# Patient Record
Sex: MALE | Race: WHITE | NOT HISPANIC OR LATINO | Employment: FULL TIME | URBAN - METROPOLITAN AREA
[De-identification: names, ages, dates, MRNs, and addresses within clinical notes are randomized per-mention and may not be internally consistent; named-entity substitution may affect disease eponyms.]

---

## 2017-06-08 ENCOUNTER — HOSPITAL ENCOUNTER (OUTPATIENT)
Dept: RADIOLOGY | Facility: MEDICAL CENTER | Age: 37
Discharge: HOME | End: 2017-06-08
Attending: FAMILY MEDICINE | Admitting: FAMILY MEDICINE
Payer: COMMERCIAL

## 2017-06-08 ENCOUNTER — OFFICE VISIT (OUTPATIENT)
Dept: URGENT CARE | Facility: MEDICAL CENTER | Age: 37
End: 2017-06-08
Payer: COMMERCIAL

## 2017-06-08 DIAGNOSIS — R10.9 ABDOMINAL PAIN: ICD-10-CM

## 2017-06-08 PROCEDURE — 74000 HB X-RAY EXAM OF ABDOMEN (SINGLE ANTEROPOSTERIOR VIEW): CPT

## 2017-06-08 PROCEDURE — 81002 URINALYSIS NONAUTO W/O SCOPE: CPT

## 2017-06-08 PROCEDURE — G0382 LEV 3 HOSP TYPE B ED VISIT: HCPCS

## 2017-08-22 ENCOUNTER — ALLSCRIPTS OFFICE VISIT (OUTPATIENT)
Dept: OTHER | Facility: OTHER | Age: 37
End: 2017-08-22

## 2017-08-22 ENCOUNTER — TRANSCRIBE ORDERS (OUTPATIENT)
Dept: ADMINISTRATIVE | Facility: HOSPITAL | Age: 37
End: 2017-08-22

## 2017-08-22 ENCOUNTER — APPOINTMENT (OUTPATIENT)
Dept: LAB | Facility: HOSPITAL | Age: 37
End: 2017-08-22
Attending: UROLOGY
Payer: COMMERCIAL

## 2017-08-22 DIAGNOSIS — R31.0 GROSS HEMATURIA: Primary | ICD-10-CM

## 2017-08-22 DIAGNOSIS — R31.0 GROSS HEMATURIA: ICD-10-CM

## 2017-08-22 DIAGNOSIS — N20.0 CALCULUS OF KIDNEY: ICD-10-CM

## 2017-08-22 DIAGNOSIS — N20.0 KIDNEY STONE: ICD-10-CM

## 2017-08-22 LAB
BILIRUB UR QL STRIP: ABNORMAL
CLARITY UR: ABNORMAL
COLOR UR: CLEAR
GLUCOSE (HISTORICAL): ABNORMAL
HGB UR QL STRIP.AUTO: ABNORMAL
KETONES UR STRIP-MCNC: ABNORMAL MG/DL
LEUKOCYTE ESTERASE UR QL STRIP: ABNORMAL
NITRITE UR QL STRIP: ABNORMAL
PH UR STRIP.AUTO: 7 [PH]
PROT UR STRIP-MCNC: ABNORMAL MG/DL
SP GR UR STRIP.AUTO: 1.01
UROBILINOGEN UR QL STRIP.AUTO: 0.2

## 2017-08-22 PROCEDURE — 88112 CYTOPATH CELL ENHANCE TECH: CPT

## 2017-08-27 ENCOUNTER — HOSPITAL ENCOUNTER (EMERGENCY)
Facility: HOSPITAL | Age: 37
Discharge: HOME | End: 2017-08-27
Attending: EMERGENCY MEDICINE | Admitting: EMERGENCY MEDICINE
Payer: COMMERCIAL

## 2017-08-27 VITALS
BODY MASS INDEX: 21.98 KG/M2 | HEART RATE: 65 BPM | DIASTOLIC BLOOD PRESSURE: 70 MMHG | TEMPERATURE: 96.6 F | WEIGHT: 145 LBS | OXYGEN SATURATION: 100 % | RESPIRATION RATE: 18 BRPM | SYSTOLIC BLOOD PRESSURE: 129 MMHG | HEIGHT: 68 IN

## 2017-08-27 DIAGNOSIS — N48.89 PENILE PAIN: Primary | ICD-10-CM

## 2017-08-27 LAB
BACTERIA UR QL AUTO: ABNORMAL /HPF
BILIRUB UR QL STRIP: NEGATIVE
CLARITY UR: CLEAR
COLOR UR: YELLOW
COLOR, POC: YELLOW
GLUCOSE UR STRIP-MCNC: NEGATIVE MG/DL
HGB UR QL STRIP.AUTO: ABNORMAL
HYALINE CASTS #/AREA URNS LPF: ABNORMAL /LPF
KETONES UR STRIP-MCNC: NEGATIVE MG/DL
LEUKOCYTE ESTERASE UR QL STRIP: ABNORMAL
NITRITE UR QL STRIP: NEGATIVE
NON-SQ EPI CELLS URNS QL MICRO: ABNORMAL /HPF
PH UR STRIP.AUTO: 6.5 [PH] (ref 4.5–8)
PROT UR STRIP-MCNC: NEGATIVE MG/DL
RBC #/AREA URNS AUTO: ABNORMAL /HPF
SP GR UR STRIP.AUTO: 1.01 (ref 1–1.03)
UROBILINOGEN UR QL STRIP.AUTO: 0.2 E.U./DL
WBC #/AREA URNS AUTO: ABNORMAL /HPF

## 2017-08-27 PROCEDURE — 87491 CHLMYD TRACH DNA AMP PROBE: CPT | Performed by: EMERGENCY MEDICINE

## 2017-08-27 PROCEDURE — 87591 N.GONORRHOEAE DNA AMP PROB: CPT | Performed by: EMERGENCY MEDICINE

## 2017-08-27 PROCEDURE — 81001 URINALYSIS AUTO W/SCOPE: CPT

## 2017-08-27 PROCEDURE — 81002 URINALYSIS NONAUTO W/O SCOPE: CPT | Performed by: EMERGENCY MEDICINE

## 2017-08-27 PROCEDURE — 99283 EMERGENCY DEPT VISIT LOW MDM: CPT

## 2017-08-27 RX ORDER — PHENAZOPYRIDINE HYDROCHLORIDE 200 MG/1
200 TABLET, FILM COATED ORAL 3 TIMES DAILY
Qty: 6 TABLET | Refills: 0 | Status: SHIPPED | OUTPATIENT
Start: 2017-08-27 | End: 2017-08-30

## 2017-08-28 ENCOUNTER — HOSPITAL ENCOUNTER (OUTPATIENT)
Dept: RADIOLOGY | Age: 37
Discharge: HOME | End: 2017-08-28
Payer: COMMERCIAL

## 2017-08-28 DIAGNOSIS — R31.0 GROSS HEMATURIA: ICD-10-CM

## 2017-08-28 LAB
CHLAMYDIA DNA CVX QL NAA+PROBE: NORMAL
N GONORRHOEA DNA GENITAL QL NAA+PROBE: NORMAL

## 2017-08-28 PROCEDURE — 74178 CT ABD&PLV WO CNTR FLWD CNTR: CPT

## 2017-08-28 RX ADMIN — IOHEXOL 100 ML: 350 INJECTION, SOLUTION INTRAVENOUS at 09:44

## 2017-08-29 ENCOUNTER — GENERIC CONVERSION - ENCOUNTER (OUTPATIENT)
Dept: OTHER | Facility: OTHER | Age: 37
End: 2017-08-29

## 2017-08-31 ENCOUNTER — ALLSCRIPTS OFFICE VISIT (OUTPATIENT)
Dept: OTHER | Facility: OTHER | Age: 37
End: 2017-08-31

## 2017-08-31 LAB
BILIRUB UR QL STRIP: NORMAL
CLARITY UR: NORMAL
COLOR UR: YELLOW
GLUCOSE (HISTORICAL): NORMAL
HGB UR QL STRIP.AUTO: NORMAL
KETONES UR STRIP-MCNC: NORMAL MG/DL
LEUKOCYTE ESTERASE UR QL STRIP: NORMAL
NITRITE UR QL STRIP: NORMAL
PH UR STRIP.AUTO: 5.5 [PH]
PROT UR STRIP-MCNC: NORMAL MG/DL
SP GR UR STRIP.AUTO: 1.01
UROBILINOGEN UR QL STRIP.AUTO: 0.2

## 2017-09-05 ENCOUNTER — GENERIC CONVERSION - ENCOUNTER (OUTPATIENT)
Dept: OTHER | Facility: OTHER | Age: 37
End: 2017-09-05

## 2017-09-07 ENCOUNTER — ANESTHESIA EVENT (OUTPATIENT)
Dept: PERIOP | Facility: HOSPITAL | Age: 37
End: 2017-09-07
Payer: COMMERCIAL

## 2017-09-07 RX ORDER — HYDROCODONE BITARTRATE AND ACETAMINOPHEN 5; 300 MG/1; MG/1
1 TABLET ORAL EVERY 6 HOURS PRN
COMMUNITY

## 2017-09-08 ENCOUNTER — ANESTHESIA (OUTPATIENT)
Dept: PERIOP | Facility: HOSPITAL | Age: 37
End: 2017-09-08
Payer: COMMERCIAL

## 2017-09-08 ENCOUNTER — HOSPITAL ENCOUNTER (OUTPATIENT)
Facility: HOSPITAL | Age: 37
Setting detail: OUTPATIENT SURGERY
Discharge: HOME | End: 2017-09-08
Attending: UROLOGY | Admitting: UROLOGY
Payer: COMMERCIAL

## 2017-09-08 ENCOUNTER — HOSPITAL ENCOUNTER (OUTPATIENT)
Dept: RADIOLOGY | Facility: HOSPITAL | Age: 37
Setting detail: OUTPATIENT SURGERY
Discharge: HOME | End: 2017-09-08
Payer: COMMERCIAL

## 2017-09-08 VITALS
RESPIRATION RATE: 16 BRPM | TEMPERATURE: 97.6 F | DIASTOLIC BLOOD PRESSURE: 68 MMHG | SYSTOLIC BLOOD PRESSURE: 109 MMHG | BODY MASS INDEX: 21.98 KG/M2 | HEIGHT: 68 IN | WEIGHT: 145 LBS | HEART RATE: 60 BPM | OXYGEN SATURATION: 99 %

## 2017-09-08 DIAGNOSIS — N20.1 CALCULUS OF URETER: ICD-10-CM

## 2017-09-08 PROCEDURE — 76000 FLUOROSCOPY <1 HR PHYS/QHP: CPT

## 2017-09-08 PROCEDURE — 82360 CALCULUS ASSAY QUANT: CPT | Performed by: UROLOGY

## 2017-09-08 RX ORDER — PROPOFOL 10 MG/ML
INJECTION, EMULSION INTRAVENOUS AS NEEDED
Status: DISCONTINUED | OUTPATIENT
Start: 2017-09-08 | End: 2017-09-08 | Stop reason: SURG

## 2017-09-08 RX ORDER — OXYCODONE HYDROCHLORIDE AND ACETAMINOPHEN 5; 325 MG/1; MG/1
1 TABLET ORAL EVERY 4 HOURS PRN
Status: DISCONTINUED | OUTPATIENT
Start: 2017-09-08 | End: 2017-09-08 | Stop reason: HOSPADM

## 2017-09-08 RX ORDER — SODIUM CHLORIDE 9 MG/ML
125 INJECTION, SOLUTION INTRAVENOUS CONTINUOUS
Status: DISCONTINUED | OUTPATIENT
Start: 2017-09-08 | End: 2017-09-08 | Stop reason: HOSPADM

## 2017-09-08 RX ORDER — FENTANYL CITRATE 50 UG/ML
INJECTION, SOLUTION INTRAMUSCULAR; INTRAVENOUS AS NEEDED
Status: DISCONTINUED | OUTPATIENT
Start: 2017-09-08 | End: 2017-09-08 | Stop reason: SURG

## 2017-09-08 RX ORDER — SCOLOPAMINE TRANSDERMAL SYSTEM 1 MG/1
1 PATCH, EXTENDED RELEASE TRANSDERMAL ONCE AS NEEDED
Status: DISCONTINUED | OUTPATIENT
Start: 2017-09-08 | End: 2017-09-08 | Stop reason: HOSPADM

## 2017-09-08 RX ORDER — MAGNESIUM HYDROXIDE 1200 MG/15ML
LIQUID ORAL AS NEEDED
Status: DISCONTINUED | OUTPATIENT
Start: 2017-09-08 | End: 2017-09-08 | Stop reason: HOSPADM

## 2017-09-08 RX ORDER — ONDANSETRON 2 MG/ML
4 INJECTION INTRAMUSCULAR; INTRAVENOUS ONCE AS NEEDED
Status: DISCONTINUED | OUTPATIENT
Start: 2017-09-08 | End: 2017-09-08 | Stop reason: HOSPADM

## 2017-09-08 RX ORDER — GLYCOPYRROLATE 0.2 MG/ML
INJECTION INTRAMUSCULAR; INTRAVENOUS AS NEEDED
Status: DISCONTINUED | OUTPATIENT
Start: 2017-09-08 | End: 2017-09-08 | Stop reason: SURG

## 2017-09-08 RX ORDER — ONDANSETRON 2 MG/ML
INJECTION INTRAMUSCULAR; INTRAVENOUS AS NEEDED
Status: DISCONTINUED | OUTPATIENT
Start: 2017-09-08 | End: 2017-09-08 | Stop reason: SURG

## 2017-09-08 RX ORDER — FENTANYL CITRATE/PF 50 MCG/ML
50 SYRINGE (ML) INJECTION
Status: DISCONTINUED | OUTPATIENT
Start: 2017-09-08 | End: 2017-09-08 | Stop reason: HOSPADM

## 2017-09-08 RX ORDER — LIDOCAINE HYDROCHLORIDE 10 MG/ML
INJECTION, SOLUTION INFILTRATION; PERINEURAL AS NEEDED
Status: DISCONTINUED | OUTPATIENT
Start: 2017-09-08 | End: 2017-09-08 | Stop reason: SURG

## 2017-09-08 RX ORDER — GENTAMICIN SULFATE 100 MG/100ML
100 INJECTION, SOLUTION INTRAVENOUS ONCE
Status: COMPLETED | OUTPATIENT
Start: 2017-09-08 | End: 2017-09-08

## 2017-09-08 RX ORDER — KETOROLAC TROMETHAMINE 30 MG/ML
INJECTION, SOLUTION INTRAMUSCULAR; INTRAVENOUS AS NEEDED
Status: DISCONTINUED | OUTPATIENT
Start: 2017-09-08 | End: 2017-09-08 | Stop reason: SURG

## 2017-09-08 RX ORDER — SODIUM CHLORIDE 9 MG/ML
INJECTION, SOLUTION INTRAVENOUS AS NEEDED
Status: DISCONTINUED | OUTPATIENT
Start: 2017-09-08 | End: 2017-09-08 | Stop reason: HOSPADM

## 2017-09-08 RX ADMIN — FENTANYL CITRATE 50 MCG: 50 INJECTION, SOLUTION INTRAMUSCULAR; INTRAVENOUS at 10:19

## 2017-09-08 RX ADMIN — SODIUM CHLORIDE 125 ML/HR: 0.9 INJECTION, SOLUTION INTRAVENOUS at 08:56

## 2017-09-08 RX ADMIN — GENTAMICIN SULFATE 100 MG: 100 INJECTION, SOLUTION INTRAVENOUS at 10:39

## 2017-09-08 RX ADMIN — KETOROLAC TROMETHAMINE 30 MG: 30 INJECTION, SOLUTION INTRAMUSCULAR at 10:50

## 2017-09-08 RX ADMIN — LIDOCAINE HYDROCHLORIDE 50 MG: 10 INJECTION, SOLUTION INFILTRATION; PERINEURAL at 10:23

## 2017-09-08 RX ADMIN — GLYCOPYRROLATE 0.2 MG: 0.2 INJECTION, SOLUTION INTRAMUSCULAR; INTRAVENOUS at 10:33

## 2017-09-08 RX ADMIN — PROPOFOL 200 MG: 10 INJECTION, EMULSION INTRAVENOUS at 10:23

## 2017-09-08 RX ADMIN — DEXAMETHASONE SODIUM PHOSPHATE 10 MG: 10 INJECTION INTRAMUSCULAR; INTRAVENOUS at 10:41

## 2017-09-08 RX ADMIN — ONDANSETRON HYDROCHLORIDE 4 MG: 2 INJECTION, SOLUTION INTRAVENOUS at 10:44

## 2017-09-08 RX ADMIN — SODIUM CHLORIDE 125 ML/HR: 0.9 INJECTION, SOLUTION INTRAVENOUS at 11:28

## 2017-09-08 RX ADMIN — CEFAZOLIN SODIUM 2000 MG: 2 SOLUTION INTRAVENOUS at 10:26

## 2017-09-08 RX ADMIN — SCOPALAMINE 1 PATCH: 1 PATCH, EXTENDED RELEASE TRANSDERMAL at 09:50

## 2017-09-18 ENCOUNTER — GENERIC CONVERSION - ENCOUNTER (OUTPATIENT)
Dept: OTHER | Facility: OTHER | Age: 37
End: 2017-09-18

## 2017-09-19 LAB
CA PHOS MFR STONE: 5 %
CALCIUM OXALATE DIHYDRATE MFR STONE IR: 25 %
COLOR STONE: NORMAL
COM MFR STONE: 70 %
COMMENT-STONE3: NORMAL
COMPOSITION: NORMAL
LABORATORY COMMENT REPORT: NORMAL
NIDUS STONE QL: NORMAL
PHOTO: NORMAL
SIZE STONE: NORMAL MM
STONE ANALYSIS-IMP: NORMAL
SURFACE CRYSTALS: NORMAL
WT STONE: 28.4 MG

## 2017-12-11 ENCOUNTER — TRANSCRIBE ORDERS (OUTPATIENT)
Dept: ADMINISTRATIVE | Facility: HOSPITAL | Age: 37
End: 2017-12-11

## 2017-12-11 ENCOUNTER — APPOINTMENT (OUTPATIENT)
Dept: RADIOLOGY | Facility: MEDICAL CENTER | Age: 37
End: 2017-12-11
Attending: UROLOGY
Payer: COMMERCIAL

## 2017-12-11 ENCOUNTER — HOSPITAL ENCOUNTER (OUTPATIENT)
Dept: ULTRASOUND IMAGING | Facility: MEDICAL CENTER | Age: 37
Discharge: HOME | End: 2017-12-11
Attending: UROLOGY
Payer: COMMERCIAL

## 2017-12-11 ENCOUNTER — GENERIC CONVERSION - ENCOUNTER (OUTPATIENT)
Dept: OTHER | Facility: OTHER | Age: 37
End: 2017-12-11

## 2017-12-11 DIAGNOSIS — N20.0 KIDNEY STONE: ICD-10-CM

## 2017-12-11 DIAGNOSIS — N20.0 CALCULUS OF KIDNEY: ICD-10-CM

## 2017-12-11 PROCEDURE — 76770 US EXAM ABDO BACK WALL COMP: CPT

## 2017-12-11 PROCEDURE — 74000 HB X-RAY EXAM OF ABDOMEN (SINGLE ANTEROPOSTERIOR VIEW): CPT

## 2017-12-18 ENCOUNTER — ALLSCRIPTS OFFICE VISIT (OUTPATIENT)
Dept: OTHER | Facility: OTHER | Age: 37
End: 2017-12-18

## 2017-12-18 DIAGNOSIS — N20.0 CALCULUS OF KIDNEY: ICD-10-CM

## 2017-12-18 LAB
BILIRUB UR QL STRIP: NORMAL
CLARITY UR: NORMAL
COLOR UR: YELLOW
GLUCOSE (HISTORICAL): NORMAL
HGB UR QL STRIP.AUTO: NORMAL
KETONES UR STRIP-MCNC: NORMAL MG/DL
LEUKOCYTE ESTERASE UR QL STRIP: NORMAL
NITRITE UR QL STRIP: NORMAL
PH UR STRIP.AUTO: 5.5 [PH]
PROT UR STRIP-MCNC: NORMAL MG/DL
SP GR UR STRIP.AUTO: 1
UROBILINOGEN UR QL STRIP.AUTO: 0.2

## 2018-01-12 NOTE — MISCELLANEOUS
Message   Recorded as Task   Date: 08/29/2017 11:43 AM, Created By: Josefina Perez   Task Name: Medical Complaint Callback   Assigned To: Arun POWELL,TEAM   Regarding Patient: Fab Cortez, Status: Active   CommentAlie Orlando - 29 Aug 2017 11:43 AM     TASK CREATED  Caller: Self; Medical Complaint; [phone] (Home)  PATIENT CALLED ASKING TO SPEAK WITH  [facility] REGARDING HIS KIDNEY STONES, PT HAS SOME DISCOMFORT PLEASE CALL HIM BACK   Elaine Sparrow - 29 Aug 2017 12:03 PM     TASK EDITED  Pt  had CT and labs done 8/28  C/O penile discomfort requesting appt to be moved up  Appt for 8/31 with Dr Jana Crespo  Active Problems    1  Abdominal pain (789 00) (R10 9)   2  Calculus of kidney (592 0) (N20 0)   3  Dysuria (788 1) (R30 0)   4  Hematuria, gross (599 71) (R31 0)   5  Renal colic (859 7) (F42)    Current Meds   1  Tamsulosin HCl - 0 4 MG Oral Capsule; take 1 capsule daily; Therapy: 13QBT9461 to (Evaluate:23Jun2017)  Requested for: 32EMZ9302; Last   Rx:08Jun2017 Ordered    Allergies    1   No Known Drug Allergies    Signatures   Electronically signed by : Rashi Mccord RN; Aug 29 2017 12:04PM EST                       (Author)

## 2018-01-13 VITALS
WEIGHT: 146 LBS | HEIGHT: 68 IN | SYSTOLIC BLOOD PRESSURE: 110 MMHG | BODY MASS INDEX: 22.13 KG/M2 | DIASTOLIC BLOOD PRESSURE: 76 MMHG

## 2018-01-13 VITALS
WEIGHT: 148 LBS | SYSTOLIC BLOOD PRESSURE: 120 MMHG | HEIGHT: 68 IN | BODY MASS INDEX: 22.43 KG/M2 | DIASTOLIC BLOOD PRESSURE: 70 MMHG

## 2018-01-13 NOTE — MISCELLANEOUS
Message   Recorded as Task   Date: 09/05/2017 08:42 AM, Created By: Kamille Pan   Task Name: Call Back   Assigned To: Arun POWELL,TEAM   Regarding Patient: Bj Batista, Status: Active   Comment:    Bree Gauthier - 05 Sep 2017 8:42 AM     TASK CREATED  PATIENT QUESTIONING RECOVERY TIME AND EXPECTED PAIN LEVEL AFTER HIS 9/8/2017 CYSTOSCOPY, LEFT USCOPE, LEFT  STENT INSERTION WITH DR Na Boothe  PLEASE CONTACT THE PATIENT AT [phone]  MESSAGE LEFT ON MY VOICE MAIL 9/1/2017 @ 5:00PM    Elaine Sparrow - 05 Sep 2017 9:19 AM     TASK EDITED  Pt  had upcoming questions re: procedure  Questions answered to pt's satisfaction  Active Problems    1  Abdominal pain (789 00) (R10 9)   2  Calculus of kidney (592 0) (N20 0)   3  Dysuria (788 1) (R30 0)   4  Hematuria, gross (599 71) (R31 0)   5  Renal colic (507 6) (Z80)    Current Meds   1  Vicodin 5-300 MG Oral Tablet; TAKE 1 TO 2 TABLETS EVERY 4 TO 6 HOURS AS   NEEDED FOR PAIN;   Therapy: 42Hbn1125 to (Evaluate:42Yzo4909); Last Rx:[id number] Ordered    Allergies    1   No Known Drug Allergies    Signatures   Electronically signed by : Francis Nova RN; Sep  5 2017  9:20AM EST                       (Author)

## 2018-01-14 VITALS
DIASTOLIC BLOOD PRESSURE: 64 MMHG | WEIGHT: 145 LBS | HEIGHT: 68 IN | SYSTOLIC BLOOD PRESSURE: 100 MMHG | BODY MASS INDEX: 21.98 KG/M2

## 2018-01-23 VITALS
BODY MASS INDEX: 23.49 KG/M2 | SYSTOLIC BLOOD PRESSURE: 116 MMHG | DIASTOLIC BLOOD PRESSURE: 72 MMHG | HEIGHT: 68 IN | WEIGHT: 155 LBS

## 2018-08-21 ENCOUNTER — APPOINTMENT (OUTPATIENT)
Dept: LAB | Facility: CLINIC | Age: 38
End: 2018-08-21

## 2018-08-21 ENCOUNTER — TRANSCRIBE ORDERS (OUTPATIENT)
Dept: LAB | Facility: CLINIC | Age: 38
End: 2018-08-21

## 2018-08-21 DIAGNOSIS — Z00.8 HEALTH EXAMINATION IN POPULATION SURVEY: ICD-10-CM

## 2018-08-21 DIAGNOSIS — Z00.8 HEALTH EXAMINATION IN POPULATION SURVEY: Primary | ICD-10-CM

## 2018-08-21 LAB
CHOLEST SERPL-MCNC: 271 MG/DL (ref 50–200)
EST. AVERAGE GLUCOSE BLD GHB EST-MCNC: 108 MG/DL
HBA1C MFR BLD: 5.4 % (ref 4.2–6.3)
HDLC SERPL-MCNC: 74 MG/DL (ref 40–60)
LDLC SERPL CALC-MCNC: 185 MG/DL (ref 0–100)
NONHDLC SERPL-MCNC: 197 MG/DL
TRIGL SERPL-MCNC: 62 MG/DL

## 2018-08-21 PROCEDURE — 80061 LIPID PANEL: CPT

## 2018-08-21 PROCEDURE — 36415 COLL VENOUS BLD VENIPUNCTURE: CPT

## 2018-08-21 PROCEDURE — 83036 HEMOGLOBIN GLYCOSYLATED A1C: CPT

## 2021-03-15 ENCOUNTER — OFFICE VISIT (OUTPATIENT)
Dept: URGENT CARE | Facility: CLINIC | Age: 41
End: 2021-03-15
Payer: COMMERCIAL

## 2021-03-15 VITALS
HEART RATE: 92 BPM | WEIGHT: 145 LBS | TEMPERATURE: 97.8 F | RESPIRATION RATE: 18 BRPM | DIASTOLIC BLOOD PRESSURE: 60 MMHG | SYSTOLIC BLOOD PRESSURE: 92 MMHG | OXYGEN SATURATION: 95 % | BODY MASS INDEX: 21.98 KG/M2 | HEIGHT: 68 IN

## 2021-03-15 DIAGNOSIS — R51.9 ACUTE NONINTRACTABLE HEADACHE, UNSPECIFIED HEADACHE TYPE: Primary | ICD-10-CM

## 2021-03-15 DIAGNOSIS — R11.0 NAUSEA: ICD-10-CM

## 2021-03-15 PROCEDURE — G0382 LEV 3 HOSP TYPE B ED VISIT: HCPCS | Performed by: PHYSICIAN ASSISTANT

## 2021-03-15 PROCEDURE — U0003 INFECTIOUS AGENT DETECTION BY NUCLEIC ACID (DNA OR RNA); SEVERE ACUTE RESPIRATORY SYNDROME CORONAVIRUS 2 (SARS-COV-2) (CORONAVIRUS DISEASE [COVID-19]), AMPLIFIED PROBE TECHNIQUE, MAKING USE OF HIGH THROUGHPUT TECHNOLOGIES AS DESCRIBED BY CMS-2020-01-R: HCPCS | Performed by: PHYSICIAN ASSISTANT

## 2021-03-15 PROCEDURE — U0005 INFEC AGEN DETEC AMPLI PROBE: HCPCS | Performed by: PHYSICIAN ASSISTANT

## 2021-03-15 RX ORDER — ONDANSETRON 4 MG/1
8 TABLET, ORALLY DISINTEGRATING ORAL EVERY 8 HOURS PRN
Qty: 20 TABLET | Refills: 0 | Status: SHIPPED | OUTPATIENT
Start: 2021-03-15

## 2021-03-15 NOTE — PROGRESS NOTES
[facility]        NAME: Mary Grace Calix is a 36 y o  male  : 1980    MRN: [id number]  DATE: March 15, 2021  TIME: 1:36 PM    Assessment and Plan   Acute nonintractable headache, unspecified headache type [R51 9]  1  Acute nonintractable headache, unspecified headache type  Novel Coronavirus (COVID-19), PCR [facility] Collected at [place] [address] or [facility]    Novel Coronavirus (COVID-19), PCR [facility] Collected at [place] [address] or [facility]   2  Nausea  ondansetron (ZOFRAN-ODT) 4 mg disintegrating tablet         Patient Instructions   Patient Instructions     If your symptoms began on 3/7 you 10 day quarantine will end on 3/17/2021  Remain self isolated until then  COVID instructions provided to patient and patient instructed to self isolate at home until swab returns  Will follow up when COVID swab is available, Recommend to check [address] [place] [address] for quickest access to results  If result is positive individual must quarantine for 10 days from symptom onset  If a significant exposure occurred ( within 6 feet of a COVID positive pt for 15 minutes or more without a mask on) the exposed individual must self isolate for 14 days from that date and monitor for symptoms  Recommend taking Vitamins such as D3, C and Zinc OTC  Vitamin D3 2000 IU once a day  Vitamin C 1g by mouth twice a day 12 hours apart  It is recommended to wear a mask while in public or within 6 feet from others, proper hand washing and hygiene  If you are feeling unwell, we recommend to self-isolate at home and stay away from household contacts until well again  If you develop any chest pain, chest pain with deep breathing, shortness of breath, or trouble breathing go to the ER  COVID-19 (Coronavirus Disease 2019)   AMBULATORY CARE:   Coronavirus disease 2019 (COVID-19)  is the disease caused by the novel (new) coronavirus first discovered in 2019   Coronaviruses generally cause upper respiratory (nose, throat, and lung) infections, such as a cold  The new virus can also cause serious lower respiratory conditions, such as pneumonia or acute respiratory distress syndrome (ARDS)  Anyone can develop serious problems from the new virus, but your risk is higher if you are 65 or older  A weak immune system, diabetes, or a heart or lung condition can also increase your risk  Signs and symptoms: You may not develop any signs or symptoms  Signs and symptoms that do develop usually start about 5 days after infection but can take 2 to 14 days  Signs and symptoms range from mild to severe  You may feel like you have the flu or a bad cold  Information on COVID-19 is still being learned  Tell your healthcare provider if you think you were infected but develop signs or symptoms not listed below:  · A cough    · Shortness of breath or trouble breathing that may become severe    · A fever of at least 100 4°F, or 38°C (may be lower in adults 65 or older)    · Chills that might include shaking    · Muscle pain, body aches, or a headache    · A sore throat    · Suddenly not being able to taste or smell anything    · Feeling mentally and physically tired (fatigue)    · Congestion (stuffy head and nose), or a runny nose    · Diarrhea, nausea, or vomiting    If you think you or someone you know may be infected:  Do the following to protect others:  · If emergency care is needed,  tell the  about the possible infection, or call ahead and tell the emergency department  · Call a healthcare provider  for instructions if symptoms are mild  Anyone who may be infected should not  arrive without calling first  The provider will need to protect staff members and other patients  · The person who may be infected needs to wear a face covering  while getting medical care  This will help lower the risk of infecting others  Coverings are not used for anyone who is younger than 2 years, has breathing problems, or cannot remove it   The provider can give you instructions for anyone who cannot wear a covering  Call your local emergency number (911 in the [address]) or go to the emergency department if:   · You have trouble breathing or shortness of breath at rest     · You have chest pain or pressure that lasts longer than 5 minutes  · You become confused or hard to wake  · Your lips or face are blue  · You have a fever of 104°F (40°C) or higher  Call your doctor if:   · You do not  have symptoms of COVID-19 but had close physical contact within 14 days with someone who tested positive  · You have questions or concerns about your condition or care  How COVID-19 is diagnosed: If you think you have COVID-19, call your healthcare provider  In some areas, testing is only done if a person has severe symptoms or is hospitalized  Testing is done more widely in other places  Your provider will tell you what to do based on your symptoms and the rules in your area  In general, the following may be used:  · A viral test  shows if you have a current infection  Samples are taken from your nose and throat, usually with swabs  You may need to wait several days to get the test results  Your healthcare provider will tell you how to get your results  You will need to quarantine (stay physically away from others) until you get your results  If results show you have COVID-19, you will need to quarantine until you are well  Your provider or other health official may give you more directions  You will also need to prevent another infection until it is known if you can get COVID-19 again  · An antibody test  shows if you had a past infection  Blood samples are used for this test  Antibodies are made by your immune system to attack the virus that causes COVID-19  Antibodies will form 1 to 3 weeks after you are infected  It is not known if antibodies prevent a second infection, or for how long a person might be protected   If you have antibodies, you will still need to be careful around others until more is known  · CT scans or x-rays  may be used to check for signs of pneumonia  The 2019 coronavirus causes a specific kind of pneumonia, usually in both lungs  Treatment:  No medicine or specific treatment is currently approved for COVID-19  The following may be used to manage your symptoms or treat the effects of COVID-19:  · Mild symptoms  may get better on their own  If you do not need to be treated in a hospital, you will be given instructions to use at home  Your condition will be closely monitored  You will need to watch for worsening symptoms and seek immediate care if needed  Talk to your healthcare provider about the following:    ? Relieve your symptoms  To soothe a sore throat, gargle with warm salt water, or use throat lozenges or a throat spray  Your healthcare provider may recommend a cough medicine  Drink more liquids to thin and loosen mucus and to prevent dehydration  Use decongestants or saline drops as directed for nasal congestion  ? NSAIDs or acetaminophen  can help lower a fever and relieve body aches or a headache  Follow directions  If not taken correctly, NSAIDs can cause kidney damage and acetaminophen can cause liver damage  · Severe or life-threatening symptoms  are treated in the hospital  You may need a combination of the following:    ? Medicines  may be given to reduce inflammation or to fight the virus  You may also need blood thinners to prevent or treat blood clots  If you have a deep vein thrombosis (DVT) or pulmonary embolism (PE), you may need to keep using blood thinners for 3 months  ? Extra oxygen  may be given if you have respiratory failure  This means your lungs cannot get enough oxygen into your blood and out to your organs  Extra oxygen can help prevent organ failure  ? A ventilator  may be used to help you breathe  ? Convalescent plasma (part of blood)  from a patient who has recovered from COVID-19 may be used  The plasma contains antibodies that can help your body fight the infection  Convalescent plasma is only given to patients who have severe signs and symptoms  How the 2019 coronavirus spreads: The virus spreads quickly and easily  You can become infected if you are in contact with a large amount of the virus, even for a short time  You can also become infected by being around a small amount of virus for a long time  The following are ways the virus is thought to spread, but more information may be coming:  · Droplets are the most common way all coronaviruses spread  The virus can travel in droplets that form when a person talks, coughs, or sneezes  Anyone who breathes in the droplets or gets them in his or her eyes can become infected with the virus  Close personal contact with an infected person is thought to be the main way the virus spreads  Close personal contact means you are within 6 feet (2 meters) of the person  · Person-to-person contact can spread the virus  For example, a person with the virus on his or her hands can spread it by shaking hands with someone  At this time, it does not appear that the virus can be passed to a baby during pregnancy or delivery  The baby can be infected after he or she is born through person-to-person contact  The virus also does not appear to spread in breast milk  If you are pregnant or breastfeeding, talk to your healthcare provider or obstetrician about any concerns you have  · The virus can stay on objects and surfaces  A person can get the virus on his or her hands by touching the object or surface  Infection happens if the person then touches his or her eyes or mouth with unwashed hands  It is not yet known how long the virus can stay on an object or surface  That is why it is important to clean all surfaces that are used regularly  · An infected animal may be able to infect a person who touches it  This may happen at live markets or on a farm      How everyone can lower the risk for COVID-19:  The best way to prevent infection is to avoid anyone who is infected, but this can be hard to do  An infected person can spread the virus before signs or symptoms begin, or even if signs or symptoms never develop  The following can help lower the risk for infection:      · Wash your hands often throughout the day  Use soap and water  Rub your soapy hands together, lacing your fingers  Wash the front and back of each hand, and in between your fingers  Use the fingers of one hand to scrub under the fingernails of the other hand  Wash for at least 20 seconds  Rinse with warm, running water for several seconds  Then dry your hands with a clean towel or paper towel  Use hand  that contains alcohol if soap and water are not available  Do not touch your eyes, nose, or mouth without washing your hands first  Teach children how to wash their hands and use hand   · Cover a sneeze or cough  This prevents droplets from traveling from you to others  Turn your face away and cover your mouth and nose with a tissue  Throw the tissue away  Use the bend of your arm if a tissue is not available  Then wash your hands well with soap and water or use hand   Turn and cover your face if you are around someone who is sneezing or coughing  Teach children how to cover a cough or sneeze  · Follow worldwide, national, and local social distancing guidelines  Social distancing means people avoid close physical contact so the virus cannot spread from one person to another  Keep at least 6 feet (2 meters) between you and others  Also keep this distance from anyone who comes to your home, such as someone making a delivery  · Make a habit of not touching your face  It is not known how long the virus can stay on objects and surfaces  If you get the virus on your hands, you can transfer it to your eyes, nose, or mouth and become infected   You can also transfer it to objects, surfaces, or people  Be aware of what you touch when you go out  Examples include handrails and elevator buttons  Try not to touch anything with bare hands unless it is necessary  Wash your hands before you leave your home and when you return  · Clean and disinfect high-touch surfaces and objects often  Use a disinfecting solution or wipes  You can make a solution by diluting 4 teaspoons of bleach in 1 quart (4 cups) of water  Clean and disinfect even if you think no one living in or coming to your home is infected with the virus  You can wipe items with a disinfecting cloth before you bring them into your home  Wash your hands after you handle anything you bring into your home  · Make your immune system as healthy as possible  A weakened immune system makes you more vulnerable to the new coronavirus  No COVID-19 vaccine is available yet  Vaccines such as the flu and pneumonia vaccines can help your immune system  Your healthcare provider can tell you which vaccines to get, and when to get them  Keep your immune system as strong as possible  Do not smoke  Eat healthy foods, exercise regularly, and try to manage stress  Go to bed and wake up at the same times each day  Social distancing guidelines:  National and local social distancing rules vary  Rules may change over time as restrictions are lifted  Restrictions may return if an outbreak happens where you live  It is important to know and follow all current social distancing rules in your area  The following are general guidelines:  · Limit trips out of your home  You may be able to have food, medicines, and other supplies delivered  If possible, have delivered items left at your door or other area  Try not to have someone hand you an item  You will be so close to the person that the virus can spread between you  · Do not have close physical contact with anyone who does not live in your home    Do not shake hands with, hug, or kiss a person as a greeting  Stand or walk as far from others as possible  If you must use public transportation (such as a bus or subway), try to sit or stand away from others  You can stay safely connected with others through phone calls, e-mail messages, social media websites, and video chats  Check in on anyone who may be having a hard time socially distancing, or who lives alone  Ask administrators at nursing homes or long-term care facilities how you can safely communicate with someone living there  · Wear a cloth face covering around others who do not live in your home  Face coverings help prevent the virus from spreading to others in droplets  You can use a clear face covering if someone needs to read your lips  This is a cloth covering that has plastic over the mouth area so your lips can be seen  Do not use coverings that have breathing valves or vents  The virus can travel out of the valve or vent and be spread to others  Do not take your covering off to talk, cough, or sneeze  Do not use coverings on children younger than 2 years or on anyone who has breathing problems or cannot remove it  · Only allow medical or other necessary professionals into your home  Wear your face covering, and remind professionals to wear a face covering  Remind them to wash their hands when they arrive and before they leave  Do not  let anyone who does not live in your home in, even if the person is not sick  A person can pass the virus to others before symptoms of COVID-19 begin  Some people never even develop symptoms  Children commonly have mild symptoms or no symptoms  It may be hard to tell a child not to hug or kiss you  Explain that this is how he or she can help you stay healthy  · Do not go to someone else's home unless it is necessary  Do not go over to visit, even if the person is lonely  Only go if you need to help him or her  Make sure you both wear face coverings while you are there      · Avoid large gatherings and crowds  Gatherings or crowds of 10 or more individuals can cause the virus to spread  Examples of gatherings include parties, sporting events, [employer] services, and conferences  Crowds may form at beaches, way, and tourist attractions  Protect yourself by staying away from large gatherings and crowds  · Ask your healthcare provider for other ways to have appointments  You may be able to have appointments without having to go into the provider's office  Some providers offer phone, video, or other types of appointments  You may also be able to get prescriptions for a few months of your medicines at a time  · Stay safe if you must go out to work  You may have a job that can only be done outside your home  Keep physical distance between you and other workers as much as possible  Follow your employer's rules so everyone stays safe  If you have COVID-19 and are recovering at home:  Healthcare providers will give you specific instructions to follow  The following are general guidelines to remind you how to keep others safe until you are well:  · Wash your hands often  Use soap and water as much as possible  You can use hand  that contains alcohol if soap and water are not available  Do not share towels with anyone  If you use paper towels, throw them away in a lined trash can kept in your room or area  Use a covered trash can, if possible  · Do not go out of your home unless it is necessary  You may have to go to your healthcare provider's office for check-ups or to get prescription refills  Do not arrive at the provider's office without an appointment  Providers have to make their offices safe for staff and other patients  · Do not have close physical contact with anyone unless it is necessary  Only have close physical contact with a person giving direct care, or a baby or child you must care for  Family members and friends should not visit you   If possible, stay in a separate area or room of your home if you live with others  No one should go into the area or room except to give you care  You can visit with others by phone, video chat, e-mail, or similar systems  It is important to stay connected with others in your life while you recover  · Wear a face covering while others are near you  This can help prevent droplets from spreading the virus when you talk, sneeze, or cough  Put the covering on before anyone comes into your room or area  Remind the person to cover his or her nose and mouth before going in to provide care for you  · Do not share items  Do not share dishes, towels, or other items with anyone  Items need to be washed after you use them  · Protect your baby  Wash your hands with soap and water often throughout the day  Wear a clean face covering while you breastfeed, or while you express or pump breast milk  If possible, ask someone who is well to care for your baby  You can put breast milk in bottles for the person to use, if needed  Talk to your healthcare provider if you have any questions or concerns about caring for or bonding with your baby  He or she will tell you when to bring your baby in for check-ups and vaccines  He or she will also tell you what to do if you think your baby was infected with the new virus  · Do not handle live animals  Until more is known, it is best not to touch, play with, or handle live animals  Some animals, including pets, have been infected with the new coronavirus  Do not handle or care for animals until you are well  Care includes feeding, petting, and cuddling your pet  Do not let your pet lick you or share your food  Ask someone who is not infected to take care of your pet, if possible  If you must care for a pet, wear a face covering  Wash your hands before and after you give care  · Follow directions from your healthcare provider for being around others after you recover    You will need to wait at least 10 days after symptoms first appeared  Then you will need to have no fever for 24 hours without fever medicine, and no other symptoms  A loss of taste or smell may continue for several months  It is considered okay to be around others if this is your only symptom  It is not known for sure if or for how long a recovered person can pass the virus to others  Your provider may give you instructions, such as continuing social distancing or wearing a face covering around others  How to take care of someone who has COVID-19:  If the person lives in another home, arrange for a time to give care  Remember to bring a few pairs of disposable gloves and a cloth face covering  The following are general guidelines to help you safely care for anyone who has COVID-19:  · Wash your hands often  Wash before and after you go into the person's home, area, or room  Throw paper towels away in a lined trash can that has a lid, if possible  · Do not allow others to go near the person  No one should come into the person's home unless it is necessary  If possible, the person should be in a separate area or room if he or she lives with others  Keep the room's door shut unless you need to go in or out  Have others call, video chat, or e-mail the person if he or she is feeling well enough  The person may feel lonely if he or she is kept separate for a long period of time  Safe communication can help him or her stay connected to family and friends  · Make sure the person's room has good air flow  You may be able to open the window if the weather allows  An air conditioner can also be turned on to help air move  · Contact the person before you go in to give care  Make sure the person is wearing a face covering  Remind him or her to wash his or her hands with soap and water  He or she can use hand  that contains alcohol if soap and water are not available  Put on a face covering before you go in to give care      · Wear gloves while you give care and clean  Clean items the person uses often  Clean countertops, cooking surfaces, and the fronts and insides of the microwave and refrigerator  Clean the shower, toilet, the area around the toilet, the sink, the area around the sink, and faucets  Gather used laundry or bedding  Wash and dry items on the warmest settings the fabric allows  Wash dishes and silverware in hot, soapy water or in a   · Anything you throw away needs to go into a lined trash can  When you need to empty the trash, close the open end of the lining and tie it closed  This helps prevent items the virus is on from spilling out of the trash  Remove your gloves and throw them away  Wash your hands  Follow up with your doctor as directed:  Write down your questions so you remember to ask them during your visits  For more information:   · Centers for Disease Control and Prevention  [address] , [address]  Phone: 2- 337 - 962-4740  Web Address: [employer] com br    © Copyright [address] [place] [address] Information is for End User's use only and may not be sold, redistributed or otherwise used for commercial purposes  All illustrations and images included in CareNotes® are the copyrighted property of [employer] D [employer] M [employer]  or [address] [place] [address]  The above information is an  only  It is not intended as medical advice for individual conditions or treatments  Talk to your doctor, nurse or pharmacist before following any medical regimen to see if it is safe and effective for you  Follow up with PCP in 3-5 days  Proceed to  ER if symptoms worsen  Chief Complaint     Chief Complaint   Patient presents with    Headache     Pt states was in close contact with family mebers who were covid +3/03 and his symptoms started on 3/07 heachache, nausea, fatigue and sore throat in the am         History of Present Illness       The patient is a 44-year-old male presenting today with a headache    He states that he was in close contact with COVID positive family members on 03/03  He developed symptoms on 03/07  He began with a headache, nausea, fatigue and a sore throat  Today he still has the headache and nausea and fatigue  Review of Systems   Review of Systems   Constitutional: Positive for fatigue  Negative for activity change, appetite change, chills, diaphoresis and fever  HENT: Positive for sore throat  Negative for congestion and rhinorrhea  Respiratory: Negative for cough, chest tightness and shortness of breath  Cardiovascular: Negative for chest pain and palpitations  Gastrointestinal: Positive for nausea  Negative for abdominal pain, diarrhea and vomiting  Musculoskeletal: Negative for arthralgias and myalgias  Skin: Negative for color change and pallor  Neurological: Positive for headaches           Current Medications       Current Outpatient Medications:     HYDROcodone-acetaminophen (VICODIN) 5-300 MG per tablet, Take 1 tablet by mouth every 6 (six) hours as needed for moderate pain, Disp: , Rfl:     Omega-3 Fatty Acids (FISH OIL PO), Take by mouth 2 (two) times a week, Disp: , Rfl:     ondansetron (ZOFRAN-ODT) 4 mg disintegrating tablet, Take 2 tablets (8 mg total) by mouth every 8 (eight) hours as needed for nausea or vomiting, Disp: 20 tablet, Rfl: 0    Current Allergies     Allergies as of 03/15/2021    (No Known Allergies)            The following portions of the patient's history were reviewed and updated as appropriate: allergies, current medications, past family history, past medical history, past social history, past surgical history and problem list      Past Medical History:   Diagnosis Date    Blood in the urine     approx 1 month ago, dark brownish urine, no further blood in urine noted    Headache     "few times a month"    Kidney stone     Penis pain     "tip of " but has lessened, increases rex when voiding,     PONV (postoperative nausea and vomiting) Past Surgical History:   Procedure Laterality Date    MA CYSTO/URETERO W/LITHOTRIPSY &INDWELL STENT INSRT Left 9/8/2017    Procedure: CYSTOSCOPY URETEROSCOPY with stone extraction/fragmentation;  Surgeon: Oswaldo Barajas MD;  Location: [facility];  Service: Urology    VESICOSTOMY         History reviewed  No pertinent family history  Medications have been verified  Objective   BP 92/60   Pulse 92   Temp 97 8 °F (36 6 °C) (Temporal)   Resp 18   Ht 5' 8" (1 727 m)   Wt 65 8 kg (145 lb)   SpO2 95%   BMI 22 05 kg/m²        Physical Exam     Physical Exam  Constitutional:       General: He is not in acute distress  Appearance: Normal appearance  He is normal weight  He is not ill-appearing, toxic-appearing or diaphoretic  HENT:      Head: Normocephalic and atraumatic  Nose: Nose normal  No congestion or rhinorrhea  Neck:      Musculoskeletal: Normal range of motion  Cardiovascular:      Rate and Rhythm: Normal rate and regular rhythm  Heart sounds: Normal heart sounds  No murmur  No friction rub  No gallop  Pulmonary:      Effort: Pulmonary effort is normal  No respiratory distress  Breath sounds: Normal breath sounds  No stridor  No wheezing, rhonchi or rales  Chest:      Chest wall: No tenderness  Abdominal:      General: Abdomen is flat  Bowel sounds are normal  There is no distension  Palpations: Abdomen is soft  Tenderness: There is no abdominal tenderness  There is no guarding  Lymphadenopathy:      Cervical: No cervical adenopathy  Skin:     General: Skin is warm and dry  Capillary Refill: Capillary refill takes less than 2 seconds  Neurological:      Mental Status: He is alert

## 2021-03-15 NOTE — PATIENT INSTRUCTIONS
If your symptoms began on 3/7 you 10 day quarantine will end on 3/17/2021  Remain self isolated until then  COVID instructions provided to patient and patient instructed to self isolate at home until swab returns  Will follow up when COVID swab is available, Recommend to check [address] [place] [address] for quickest access to results  If result is positive individual must quarantine for 10 days from symptom onset  If a significant exposure occurred ( within 6 feet of a COVID positive pt for 15 minutes or more without a mask on) the exposed individual must self isolate for 14 days from that date and monitor for symptoms  Recommend taking Vitamins such as D3, C and Zinc OTC  Vitamin D3 2000 IU once a day  Vitamin C 1g by mouth twice a day 12 hours apart  It is recommended to wear a mask while in public or within 6 feet from others, proper hand washing and hygiene  If you are feeling unwell, we recommend to self-isolate at home and stay away from household contacts until well again  If you develop any chest pain, chest pain with deep breathing, shortness of breath, or trouble breathing go to the ER  COVID-19 (Coronavirus Disease 2019)   AMBULATORY CARE:   Coronavirus disease 2019 (COVID-19)  is the disease caused by the novel (new) coronavirus first discovered in December 2019  Coronaviruses generally cause upper respiratory (nose, throat, and lung) infections, such as a cold  The new virus can also cause serious lower respiratory conditions, such as pneumonia or acute respiratory distress syndrome (ARDS)  Anyone can develop serious problems from the new virus, but your risk is higher if you are 65 or older  A weak immune system, diabetes, or a heart or lung condition can also increase your risk  Signs and symptoms: You may not develop any signs or symptoms  Signs and symptoms that do develop usually start about 5 days after infection but can take 2 to 14 days   Signs and symptoms range from mild to severe  You may feel like you have the flu or a bad cold  Information on COVID-19 is still being learned  Tell your healthcare provider if you think you were infected but develop signs or symptoms not listed below:  · A cough    · Shortness of breath or trouble breathing that may become severe    · A fever of at least 100 4°F, or 38°C (may be lower in adults 65 or older)    · Chills that might include shaking    · Muscle pain, body aches, or a headache    · A sore throat    · Suddenly not being able to taste or smell anything    · Feeling mentally and physically tired (fatigue)    · Congestion (stuffy head and nose), or a runny nose    · Diarrhea, nausea, or vomiting    If you think you or someone you know may be infected:  Do the following to protect others:  · If emergency care is needed,  tell the  about the possible infection, or call ahead and tell the emergency department  · Call a healthcare provider  for instructions if symptoms are mild  Anyone who may be infected should not  arrive without calling first  The provider will need to protect staff members and other patients  · The person who may be infected needs to wear a face covering  while getting medical care  This will help lower the risk of infecting others  Coverings are not used for anyone who is younger than 2 years, has breathing problems, or cannot remove it  The provider can give you instructions for anyone who cannot wear a covering  Call your local emergency number (911 in the [address] [place] [address],[address]) or go to the emergency department if:   · You have trouble breathing or shortness of breath at rest     · You have chest pain or pressure that lasts longer than 5 minutes  · You become confused or hard to wake  · Your lips or face are blue  · You have a fever of 104°F (40°C) or higher  Call your doctor if:   · You do not  have symptoms of COVID-19 but had close physical contact within 14 days with someone who tested positive      · You have questions or concerns about your condition or care  How COVID-19 is diagnosed: If you think you have COVID-19, call your healthcare provider  In some areas, testing is only done if a person has severe symptoms or is hospitalized  Testing is done more widely in other places  Your provider will tell you what to do based on your symptoms and the rules in your area  In general, the following may be used:  · A viral test  shows if you have a current infection  Samples are taken from your nose and throat, usually with swabs  You may need to wait several days to get the test results  Your healthcare provider will tell you how to get your results  You will need to quarantine (stay physically away from others) until you get your results  If results show you have COVID-19, you will need to quarantine until you are well  Your provider or other health official may give you more directions  You will also need to prevent another infection until it is known if you can get COVID-19 again  · An antibody test  shows if you had a past infection  Blood samples are used for this test  Antibodies are made by your immune system to attack the virus that causes COVID-19  Antibodies will form 1 to 3 weeks after you are infected  It is not known if antibodies prevent a second infection, or for how long a person might be protected  If you have antibodies, you will still need to be careful around others until more is known  · CT scans or x-rays  may be used to check for signs of pneumonia  The 2019 coronavirus causes a specific kind of pneumonia, usually in both lungs  Treatment:  No medicine or specific treatment is currently approved for COVID-19  The following may be used to manage your symptoms or treat the effects of COVID-19:  · Mild symptoms  may get better on their own  If you do not need to be treated in a hospital, you will be given instructions to use at home  Your condition will be closely monitored   You will need to watch for worsening symptoms and seek immediate care if needed  Talk to your healthcare provider about the following:    ? Relieve your symptoms  To soothe a sore throat, gargle with warm salt water, or use throat lozenges or a throat spray  Your healthcare provider may recommend a cough medicine  Drink more liquids to thin and loosen mucus and to prevent dehydration  Use decongestants or saline drops as directed for nasal congestion  ? NSAIDs or acetaminophen  can help lower a fever and relieve body aches or a headache  Follow directions  If not taken correctly, NSAIDs can cause kidney damage and acetaminophen can cause liver damage  · Severe or life-threatening symptoms  are treated in the hospital  You may need a combination of the following:    ? Medicines  may be given to reduce inflammation or to fight the virus  You may also need blood thinners to prevent or treat blood clots  If you have a deep vein thrombosis (DVT) or pulmonary embolism (PE), you may need to keep using blood thinners for 3 months  ? Extra oxygen  may be given if you have respiratory failure  This means your lungs cannot get enough oxygen into your blood and out to your organs  Extra oxygen can help prevent organ failure  ? A ventilator  may be used to help you breathe  ? Convalescent plasma (part of blood)  from a patient who has recovered from COVID-19 may be used  The plasma contains antibodies that can help your body fight the infection  Convalescent plasma is only given to patients who have severe signs and symptoms  How the 2019 coronavirus spreads: The virus spreads quickly and easily  You can become infected if you are in contact with a large amount of the virus, even for a short time  You can also become infected by being around a small amount of virus for a long time   The following are ways the virus is thought to spread, but more information may be coming:  · Droplets are the most common way all coronaviruses spread  The virus can travel in droplets that form when a person talks, coughs, or sneezes  Anyone who breathes in the droplets or gets them in his or her eyes can become infected with the virus  Close personal contact with an infected person is thought to be the main way the virus spreads  Close personal contact means you are within 6 feet (2 meters) of the person  · Person-to-person contact can spread the virus  For example, a person with the virus on his or her hands can spread it by shaking hands with someone  At this time, it does not appear that the virus can be passed to a baby during pregnancy or delivery  The baby can be infected after he or she is born through person-to-person contact  The virus also does not appear to spread in breast milk  If you are pregnant or breastfeeding, talk to your healthcare provider or obstetrician about any concerns you have  · The virus can stay on objects and surfaces  A person can get the virus on his or her hands by touching the object or surface  Infection happens if the person then touches his or her eyes or mouth with unwashed hands  It is not yet known how long the virus can stay on an object or surface  That is why it is important to clean all surfaces that are used regularly  · An infected animal may be able to infect a person who touches it  This may happen at live markets or on a farm  How everyone can lower the risk for COVID-19:  The best way to prevent infection is to avoid anyone who is infected, but this can be hard to do  An infected person can spread the virus before signs or symptoms begin, or even if signs or symptoms never develop  The following can help lower the risk for infection:      · Wash your hands often throughout the day  Use soap and water  Rub your soapy hands together, lacing your fingers  Wash the front and back of each hand, and in between your fingers   Use the fingers of one hand to scrub under the fingernails of the other hand  Wash for at least 20 seconds  Rinse with warm, running water for several seconds  Then dry your hands with a clean towel or paper towel  Use hand  that contains alcohol if soap and water are not available  Do not touch your eyes, nose, or mouth without washing your hands first  Teach children how to wash their hands and use hand   · Cover a sneeze or cough  This prevents droplets from traveling from you to others  Turn your face away and cover your mouth and nose with a tissue  Throw the tissue away  Use the bend of your arm if a tissue is not available  Then wash your hands well with soap and water or use hand   Turn and cover your face if you are around someone who is sneezing or coughing  Teach children how to cover a cough or sneeze  · Follow worldwide, national, and local social distancing guidelines  Social distancing means people avoid close physical contact so the virus cannot spread from one person to another  Keep at least 6 feet (2 meters) between you and others  Also keep this distance from anyone who comes to your home, such as someone making a delivery  · Make a habit of not touching your face  It is not known how long the virus can stay on objects and surfaces  If you get the virus on your hands, you can transfer it to your eyes, nose, or mouth and become infected  You can also transfer it to objects, surfaces, or people  Be aware of what you touch when you go out  Examples include handrails and elevator buttons  Try not to touch anything with bare hands unless it is necessary  Wash your hands before you leave your home and when you return  · Clean and disinfect high-touch surfaces and objects often  Use a disinfecting solution or wipes  You can make a solution by diluting 4 teaspoons of bleach in 1 quart (4 cups) of water  Clean and disinfect even if you think no one living in or coming to your home is infected with the virus   You can wipe items with a disinfecting cloth before you bring them into your home  Wash your hands after you handle anything you bring into your home  · Make your immune system as healthy as possible  A weakened immune system makes you more vulnerable to the new coronavirus  No COVID-19 vaccine is available yet  Vaccines such as the flu and pneumonia vaccines can help your immune system  Your healthcare provider can tell you which vaccines to get, and when to get them  Keep your immune system as strong as possible  Do not smoke  Eat healthy foods, exercise regularly, and try to manage stress  Go to bed and wake up at the same times each day  Social distancing guidelines:  National and local social distancing rules vary  Rules may change over time as restrictions are lifted  Restrictions may return if an outbreak happens where you live  It is important to know and follow all current social distancing rules in your area  The following are general guidelines:  · Limit trips out of your home  You may be able to have food, medicines, and other supplies delivered  If possible, have delivered items left at your door or other area  Try not to have someone hand you an item  You will be so close to the person that the virus can spread between you  · Do not have close physical contact with anyone who does not live in your home  Do not shake hands with, hug, or kiss a person as a greeting  Stand or walk as far from others as possible  If you must use public transportation (such as a bus or subway), try to sit or stand away from others  You can stay safely connected with others through phone calls, e-mail messages, social media websites, and video chats  Check in on anyone who may be having a hard time socially distancing, or who lives alone  Ask administrators at nursing homes or long-term care facilities how you can safely communicate with someone living there      · Wear a cloth face covering around others who do not live in your home   Face coverings help prevent the virus from spreading to others in droplets  You can use a clear face covering if someone needs to read your lips  This is a cloth covering that has plastic over the mouth area so your lips can be seen  Do not use coverings that have breathing valves or vents  The virus can travel out of the valve or vent and be spread to others  Do not take your covering off to talk, cough, or sneeze  Do not use coverings on children younger than 2 years or on anyone who has breathing problems or cannot remove it  · Only allow medical or other necessary professionals into your home  Wear your face covering, and remind professionals to wear a face covering  Remind them to wash their hands when they arrive and before they leave  Do not  let anyone who does not live in your home in, even if the person is not sick  A person can pass the virus to others before symptoms of COVID-19 begin  Some people never even develop symptoms  Children commonly have mild symptoms or no symptoms  It may be hard to tell a child not to hug or kiss you  Explain that this is how he or she can help you stay healthy  · Do not go to someone else's home unless it is necessary  Do not go over to visit, even if the person is lonely  Only go if you need to help him or her  Make sure you both wear face coverings while you are there  · Avoid large gatherings and crowds  Gatherings or crowds of 10 or more individuals can cause the virus to spread  Examples of gatherings include parties, sporting events, [employer] orth[employer] services, and conferences  Crowds may form at beaches, way, and tourist attractions  Protect yourself by staying away from large gatherings and crowds  · Ask your healthcare provider for other ways to have appointments  You may be able to have appointments without having to go into the provider's office  Some providers offer phone, video, or other types of appointments   You may also be able to get prescriptions for a few months of your medicines at a time  · Stay safe if you must go out to work  You may have a job that can only be done outside your home  Keep physical distance between you and other workers as much as possible  Follow your employer's rules so everyone stays safe  If you have COVID-19 and are recovering at home:  Healthcare providers will give you specific instructions to follow  The following are general guidelines to remind you how to keep others safe until you are well:  · Wash your hands often  Use soap and water as much as possible  You can use hand  that contains alcohol if soap and water are not available  Do not share towels with anyone  If you use paper towels, throw them away in a lined trash can kept in your room or area  Use a covered trash can, if possible  · Do not go out of your home unless it is necessary  You may have to go to your healthcare provider's office for check-ups or to get prescription refills  Do not arrive at the provider's office without an appointment  Providers have to make their offices safe for staff and other patients  · Do not have close physical contact with anyone unless it is necessary  Only have close physical contact with a person giving direct care, or a baby or child you must care for  Family members and friends should not visit you  If possible, stay in a separate area or room of your home if you live with others  No one should go into the area or room except to give you care  You can visit with others by phone, video chat, e-mail, or similar systems  It is important to stay connected with others in your life while you recover  · Wear a face covering while others are near you  This can help prevent droplets from spreading the virus when you talk, sneeze, or cough  Put the covering on before anyone comes into your room or area   Remind the person to cover his or her nose and mouth before going in to provide care for you     · Do not share items  Do not share dishes, towels, or other items with anyone  Items need to be washed after you use them  · Protect your baby  Wash your hands with soap and water often throughout the day  Wear a clean face covering while you breastfeed, or while you express or pump breast milk  If possible, ask someone who is well to care for your baby  You can put breast milk in bottles for the person to use, if needed  Talk to your healthcare provider if you have any questions or concerns about caring for or bonding with your baby  He or she will tell you when to bring your baby in for check-ups and vaccines  He or she will also tell you what to do if you think your baby was infected with the new virus  · Do not handle live animals  Until more is known, it is best not to touch, play with, or handle live animals  Some animals, including pets, have been infected with the new coronavirus  Do not handle or care for animals until you are well  Care includes feeding, petting, and cuddling your pet  Do not let your pet lick you or share your food  Ask someone who is not infected to take care of your pet, if possible  If you must care for a pet, wear a face covering  Wash your hands before and after you give care  · Follow directions from your healthcare provider for being around others after you recover  You will need to wait at least 10 days after symptoms first appeared  Then you will need to have no fever for 24 hours without fever medicine, and no other symptoms  A loss of taste or smell may continue for several months  It is considered okay to be around others if this is your only symptom  It is not known for sure if or for how long a recovered person can pass the virus to others  Your provider may give you instructions, such as continuing social distancing or wearing a face covering around others      How to take care of someone who has COVID-19:  If the person lives in another home, arrange for a time to give care  Remember to bring a few pairs of disposable gloves and a cloth face covering  The following are general guidelines to help you safely care for anyone who has COVID-19:  · Wash your hands often  Wash before and after you go into the person's home, area, or room  Throw paper towels away in a lined trash can that has a lid, if possible  · Do not allow others to go near the person  No one should come into the person's home unless it is necessary  If possible, the person should be in a separate area or room if he or she lives with others  Keep the room's door shut unless you need to go in or out  Have others call, video chat, or e-mail the person if he or she is feeling well enough  The person may feel lonely if he or she is kept separate for a long period of time  Safe communication can help him or her stay connected to family and friends  · Make sure the person's room has good air flow  You may be able to open the window if the weather allows  An air conditioner can also be turned on to help air move  · Contact the person before you go in to give care  Make sure the person is wearing a face covering  Remind him or her to wash his or her hands with soap and water  He or she can use hand  that contains alcohol if soap and water are not available  Put on a face covering before you go in to give care  · Wear gloves while you give care and clean  Clean items the person uses often  Clean countertops, cooking surfaces, and the fronts and insides of the microwave and refrigerator  Clean the shower, toilet, the area around the toilet, the sink, the area around the sink, and faucets  Gather used laundry or bedding  Wash and dry items on the warmest settings the fabric allows  Wash dishes and silverware in hot, soapy water or in a   · Anything you throw away needs to go into a lined trash can    When you need to empty the trash, close the open end of the lining and tie it closed  This helps prevent items the virus is on from spilling out of the trash  Remove your gloves and throw them away  Wash your hands  Follow up with your doctor as directed:  Write down your questions so you remember to ask them during your visits  For more information:   · Centers for Disease Control and Prevention  [address] , [address]  Phone: 6- 258 - 153-1391  Web Address: [employer] com     © Copyright [facility] [address] Information is for End User's use only and may not be sold, redistributed or otherwise used for commercial purposes  All illustrations and images included in CareNotes® are the copyrighted property of A D A M , Inc  or [facility]zy Guzman   The above information is an  only  It is not intended as medical advice for individual conditions or treatments  Talk to your doctor, nurse or pharmacist before following any medical regimen to see if it is safe and effective for you

## 2021-03-16 LAB — SARS-COV-2 RNA RESP QL NAA+PROBE: POSITIVE

## 2021-03-17 NOTE — RESULT ENCOUNTER NOTE
Pt reviewed positive result in [facility] [employer] [facility]  Spoke to pt, he verbalized understanding

## 2021-03-20 PROCEDURE — 99284 EMERGENCY DEPT VISIT MOD MDM: CPT | Performed by: EMERGENCY MEDICINE

## 2021-03-20 PROCEDURE — 99285 EMERGENCY DEPT VISIT HI MDM: CPT

## 2021-03-21 ENCOUNTER — HOSPITAL ENCOUNTER (EMERGENCY)
Facility: HOSPITAL | Age: 41
Discharge: HOME | End: 2021-03-21
Attending: EMERGENCY MEDICINE | Admitting: EMERGENCY MEDICINE
Payer: COMMERCIAL

## 2021-03-21 ENCOUNTER — APPOINTMENT (EMERGENCY)
Dept: RADIOLOGY | Facility: HOSPITAL | Age: 41
End: 2021-03-21
Payer: COMMERCIAL

## 2021-03-21 VITALS
WEIGHT: 145.06 LBS | BODY MASS INDEX: 22.06 KG/M2 | SYSTOLIC BLOOD PRESSURE: 122 MMHG | DIASTOLIC BLOOD PRESSURE: 70 MMHG | RESPIRATION RATE: 18 BRPM | TEMPERATURE: 98.1 F | HEART RATE: 80 BPM | OXYGEN SATURATION: 99 %

## 2021-03-21 DIAGNOSIS — R07.89 ATYPICAL CHEST PAIN: Primary | ICD-10-CM

## 2021-03-21 LAB
ANION GAP SERPL CALCULATED.3IONS-SCNC: 9 MMOL/L (ref 4–13)
BASOPHILS # BLD AUTO: 0.02 THOUSANDS/ΜL (ref 0–0.1)
BASOPHILS NFR BLD AUTO: 0 % (ref 0–1)
BUN SERPL-MCNC: 17 MG/DL (ref 5–25)
CALCIUM SERPL-MCNC: 9.1 MG/DL (ref 8.3–10.1)
CHLORIDE SERPL-SCNC: 105 MMOL/L (ref 100–108)
CO2 SERPL-SCNC: 29 MMOL/L (ref 21–32)
CREAT SERPL-MCNC: 0.93 MG/DL (ref 0.6–1.3)
D DIMER PPP FEU-MCNC: 0.46 UG/ML FEU
EOSINOPHIL # BLD AUTO: 0.04 THOUSAND/ΜL (ref 0–0.61)
EOSINOPHIL NFR BLD AUTO: 1 % (ref 0–6)
ERYTHROCYTE [DISTWIDTH] IN BLOOD BY AUTOMATED COUNT: 12.9 % (ref 11.6–15.1)
GFR SERPL CREATININE-BSD FRML MDRD: 102 ML/MIN/1.73SQ M
GLUCOSE SERPL-MCNC: 92 MG/DL (ref 65–140)
HCT VFR BLD AUTO: 38.4 % (ref 36.5–49.3)
HGB BLD-MCNC: 12.3 G/DL (ref 12–17)
IMM GRANULOCYTES # BLD AUTO: 0.02 THOUSAND/UL (ref 0–0.2)
IMM GRANULOCYTES NFR BLD AUTO: 0 % (ref 0–2)
LYMPHOCYTES # BLD AUTO: 1.2 THOUSANDS/ΜL (ref 0.6–4.47)
LYMPHOCYTES NFR BLD AUTO: 16 % (ref 14–44)
MCH RBC QN AUTO: 28.3 PG (ref 26.8–34.3)
MCHC RBC AUTO-ENTMCNC: 32 G/DL (ref 31.4–37.4)
MCV RBC AUTO: 88 FL (ref 82–98)
MONOCYTES # BLD AUTO: 0.93 THOUSAND/ΜL (ref 0.17–1.22)
MONOCYTES NFR BLD AUTO: 12 % (ref 4–12)
NEUTROPHILS # BLD AUTO: 5.35 THOUSANDS/ΜL (ref 1.85–7.62)
NEUTS SEG NFR BLD AUTO: 71 % (ref 43–75)
NRBC BLD AUTO-RTO: 0 /100 WBCS
PLATELET # BLD AUTO: 198 THOUSANDS/UL (ref 149–390)
PMV BLD AUTO: 10.6 FL (ref 8.9–12.7)
POTASSIUM SERPL-SCNC: 4 MMOL/L (ref 3.5–5.3)
RBC # BLD AUTO: 4.35 MILLION/UL (ref 3.88–5.62)
SODIUM SERPL-SCNC: 143 MMOL/L (ref 136–145)
TROPONIN I SERPL-MCNC: <0.02 NG/ML
WBC # BLD AUTO: 7.56 THOUSAND/UL (ref 4.31–10.16)

## 2021-03-21 PROCEDURE — 80048 BASIC METABOLIC PNL TOTAL CA: CPT | Performed by: EMERGENCY MEDICINE

## 2021-03-21 PROCEDURE — 93005 ELECTROCARDIOGRAM TRACING: CPT

## 2021-03-21 PROCEDURE — 85025 COMPLETE CBC W/AUTO DIFF WBC: CPT | Performed by: EMERGENCY MEDICINE

## 2021-03-21 PROCEDURE — 36415 COLL VENOUS BLD VENIPUNCTURE: CPT | Performed by: EMERGENCY MEDICINE

## 2021-03-21 PROCEDURE — 71045 X-RAY EXAM CHEST 1 VIEW: CPT

## 2021-03-21 PROCEDURE — 85379 FIBRIN DEGRADATION QUANT: CPT | Performed by: EMERGENCY MEDICINE

## 2021-03-21 PROCEDURE — 84484 ASSAY OF TROPONIN QUANT: CPT | Performed by: EMERGENCY MEDICINE

## 2021-03-21 RX ORDER — KETOROLAC TROMETHAMINE 30 MG/ML
15 INJECTION, SOLUTION INTRAMUSCULAR; INTRAVENOUS ONCE
Status: DISCONTINUED | OUTPATIENT
Start: 2021-03-21 | End: 2021-03-21 | Stop reason: HOSPADM

## 2021-03-21 RX ORDER — NAPROXEN 375 MG/1
375 TABLET ORAL 2 TIMES DAILY WITH MEALS
Qty: 14 TABLET | Refills: 0 | Status: SHIPPED | OUTPATIENT
Start: 2021-03-21

## 2021-03-21 NOTE — Clinical Note
Lazarus Bower was seen and treated in our emergency department on 3/20/2021  Diagnosis:     Meg Macias  may return to work on return date  He may return on this date: 03/23/2021         If you have any questions or concerns, please don't hesitate to call        Juliann Amanda MD    ______________________________           _______________          _______________  Hospital Representative                              Date                                Time

## 2021-03-21 NOTE — ED PROVIDER NOTES
History  Chief Complaint   Patient presents with    Chest Pain     Pt reports constant chest pain since this morning  Pt reports chest pain got worse approx 2 hours ago  Pt reports pain in on right side, denies SOB  History provided by:  Patient   used: No    Chest Pain  Pain location:  R chest  Pain quality: aching    Pain radiates to:  Does not radiate  Pain radiates to the back: no    Pain severity:  Mild  Onset quality:  Unable to specify  Timing:  Intermittent  Progression:  Waxing and waning  Chronicity:  New  Relieved by:  Nothing  Worsened by:  Nothing tried  Ineffective treatments:  None tried  Associated symptoms: no abdominal pain, no back pain, no cough, no fever, no palpitations, no shortness of breath and not vomiting        Prior to Admission Medications   Prescriptions Last Dose Informant Patient Reported? Taking? HYDROcodone-acetaminophen (VICODIN) 5-300 MG per tablet   Yes No   Sig: Take 1 tablet by mouth every 6 (six) hours as needed for moderate pain   Omega-3 Fatty Acids (FISH OIL PO)   Yes No   Sig: Take by mouth 2 (two) times a week   ondansetron (ZOFRAN-ODT) 4 mg disintegrating tablet   No No   Sig: Take 2 tablets (8 mg total) by mouth every 8 (eight) hours as needed for nausea or vomiting      Facility-Administered Medications: None       Past Medical History:   Diagnosis Date    Blood in the urine     approx 1 month ago, dark brownish urine, no further blood in urine noted    Headache     "few times a month"    Kidney stone     Penis pain     "tip of " but has lessened, increases rex when voiding,     PONV (postoperative nausea and vomiting)        Past Surgical History:   Procedure Laterality Date    SD CYSTO/URETERO W/LITHOTRIPSY &INDWELL STENT INSRT Left 9/8/2017    Procedure: CYSTOSCOPY URETEROSCOPY with stone extraction/fragmentation;  Surgeon: Ana Whatley MD;  Location: [facility];  Service: Urology    VESICOSTOMY         History reviewed  No pertinent family history  I have reviewed and agree with the history as documented  E-Cigarette/Vaping     E-Cigarette/Vaping Substances     Social History     Tobacco Use    Smoking status: Never Smoker    Smokeless tobacco: Never Used   Substance Use Topics    Alcohol use: No    Drug use: No       Review of Systems   Constitutional: Negative for chills and fever  HENT: Negative for ear pain and sore throat  Eyes: Negative for pain and visual disturbance  Respiratory: Negative for cough and shortness of breath  Cardiovascular: Positive for chest pain  Negative for palpitations  Gastrointestinal: Negative for abdominal pain and vomiting  Genitourinary: Negative for dysuria and hematuria  Musculoskeletal: Negative for arthralgias and back pain  Skin: Negative for color change and rash  Neurological: Negative for seizures and syncope  All other systems reviewed and are negative  Physical Exam  Physical Exam  Vitals signs and nursing note reviewed  Constitutional:       Appearance: He is well-developed  HENT:      Head: Normocephalic and atraumatic  Eyes:      Conjunctiva/sclera: Conjunctivae normal    Neck:      Musculoskeletal: Neck supple  Cardiovascular:      Rate and Rhythm: Normal rate and regular rhythm  Heart sounds: No murmur  Pulmonary:      Effort: Pulmonary effort is normal  No respiratory distress  Breath sounds: Normal breath sounds  Abdominal:      Palpations: Abdomen is soft  Tenderness: There is no abdominal tenderness  Skin:     General: Skin is warm and dry  Capillary Refill: Capillary refill takes less than 2 seconds  Neurological:      General: No focal deficit present  Mental Status: He is alert and oriented to person, place, and time           Vital Signs  ED Triage Vitals   Temperature Pulse Respirations Blood Pressure SpO2   03/21/21 0236 03/21/21 0016 03/21/21 0016 03/21/21 0016 03/21/21 0016   98 1 °F (36 7 °C) 80 18 122/70 99 %      Temp Source Heart Rate Source Patient Position - Orthostatic VS BP Location FiO2 (%)   03/21/21 0236 03/21/21 0016 03/21/21 0016 03/21/21 0016 --   Oral Monitor Sitting Left arm       Pain Score       --                  Vitals:    03/21/21 0016   BP: 122/70   Pulse: 80   Patient Position - Orthostatic VS: Sitting         Visual Acuity      ED Medications  Medications - No data to display    Diagnostic Studies  Results Reviewed     Procedure Component Value Units Date/Time    Troponin I [[id number]]  (Normal) Collected: 03/21/21 0154    Lab Status: Final result Specimen: Blood from Arm, Right Updated: 03/21/21 0215     Troponin I <0 02 ng/mL     D-Dimer [[id number]]  (Normal) Collected: 03/21/21 0154    Lab Status: Final result Specimen: Blood from Arm, Right Updated: 03/21/21 0210     D-Dimer, Quant 0 46 ug/ml FEU     Basic metabolic panel [[id number]] Collected: 03/21/21 0154    Lab Status: Final result Specimen: Blood from Arm, Right Updated: 03/21/21 0207     Sodium 143 mmol/L      Potassium 4 0 mmol/L      Chloride 105 mmol/L      CO2 29 mmol/L      ANION GAP 9 mmol/L      BUN 17 mg/dL      Creatinine 0 93 mg/dL      Glucose 92 mg/dL      Calcium 9 1 mg/dL      eGFR 102 ml/min/1 73sq m     Narrative:      Bailey guidelines for Chronic Kidney Disease (CKD):     Stage 1 with normal or high GFR (GFR > 90 mL/min/1 73 square meters)    Stage 2 Mild CKD (GFR = 60-89 mL/min/1 73 square meters)    Stage 3A Moderate CKD (GFR = 45-59 mL/min/1 73 square meters)    Stage 3B Moderate CKD (GFR = 30-44 mL/min/1 73 square meters)    Stage 4 Severe CKD (GFR = 15-29 mL/min/1 73 square meters)    Stage 5 End Stage CKD (GFR <15 mL/min/1 73 square meters)  Note: GFR calculation is accurate only with a steady state creatinine    CBC and differential [[id number]] Collected: 03/21/21 0154    Lab Status: Final result Specimen: Blood from Arm, Right Updated: 03/21/21 0201     WBC 7 56 Thousand/uL      RBC 4 35 Million/uL      Hemoglobin 12 3 g/dL      Hematocrit 38 4 %      MCV 88 fL      MCH 28 3 pg      MCHC 32 0 g/dL      RDW 12 9 %      MPV 10 6 fL      Platelets 728 Thousands/uL      nRBC 0 /100 WBCs      Neutrophils Relative 71 %      Immat GRANS % 0 %      Lymphocytes Relative 16 %      Monocytes Relative 12 %      Eosinophils Relative 1 %      Basophils Relative 0 %      Neutrophils Absolute 5 35 Thousands/µL      Immature Grans Absolute 0 02 Thousand/uL      Lymphocytes Absolute 1 20 Thousands/µL      Monocytes Absolute 0 93 Thousand/µL      Eosinophils Absolute 0 04 Thousand/µL      Basophils Absolute 0 02 Thousands/µL                  XR chest 1 view portable   Final Result by Emile Lopez MD (03/21 1556)      Patchy airspace opacity in the right midlung zone  In the setting of clinically suspected/proven COVID-19, this plain film appearance while nonspecific, can be seen in cases of viral pneumonia such as COVID-19  Workstation performed: [id number]                    Procedures  Procedures         ED Course             HEART Risk Score      Most Recent Value   Heart Score Risk Calculator   History  1 Filed at: 03/21/2021 0246   ECG  1 Filed at: 03/21/2021 0246   Age  0 Filed at: 03/21/2021 0246   Risk Factors  0 Filed at: 03/21/2021 0246   Troponin  0 Filed at: 03/21/2021 0246   HEART Score  2 Filed at: 03/21/2021 0246                      SBIRT 20yo+      Most Recent Value   SBIRT (23 yo +)   In order to provide better care to our patients, we are screening all of our patients for alcohol and drug use  Would it be okay to ask you these screening questions? No Filed at: 03/21/2021 0200   Initial Alcohol Screen: US AUDIT-C    1  How often do you have a drink containing alcohol?  0 Filed at: 03/21/2021 0200   3a  Male UNDER 65: How often do you have five or more drinks on one occasion? 0 Filed at: 03/21/2021 0200   3b  FEMALE Any Age, or MALE 65+:  How often do you have 4 or more drinks on one occassion? 0 Filed at: 03/21/2021 0200   Audit-C Score  0 Filed at: 03/21/2021 0200   CHRIS: How many times in the past year have you    Used an illegal drug or used a prescription medication for non-medical reasons? Never Filed at: 03/21/2021 0200                    MDM  Number of Diagnoses or Management Options  Atypical chest pain: new and requires workup  Diagnosis management comments: Right-sided chest pain starting about 2 hours prior to presentation  COVID positive about a week ago  Diagnostic increase including EKG, troponin, D-dimer are all negative at this point  Plan is for discharge charged home with outpatient follow-up  Discussed return precautions  Amount and/or Complexity of Data Reviewed  Clinical lab tests: reviewed and ordered  Tests in the radiology section of CPT®: reviewed and ordered  Review and summarize past medical records: yes  Independent visualization of images, tracings, or specimens: yes    Right-sided chest pain started about 2 hours prior to presentation  Patient tested positive for COVID    Disposition  Final diagnoses:   Atypical chest pain     Time reflects when diagnosis was documented in both MDM as applicable and the Disposition within this note     Time User Action Codes Description Comment    3/21/2021  2:29 AM Yoly Elam Add [R07 89] Atypical chest pain       ED Disposition     ED Disposition Condition Date/Time Comment    Discharge Stable Sun Mar 21, 2021  2:29 AM Estefani Alonzo discharge to home/self care              Follow-up Information     Follow up With Specialties Details Why Contact Kym Thorpe DO Internal Medicine   [address] [address]  [phone]            Discharge Medication List as of 3/21/2021  2:32 AM      START taking these medications    Details   naproxen (NAPROSYN) 375 mg tablet Take 1 tablet (375 mg total) by mouth 2 (two) times a day with meals, Starting Sun 3/21/2021, Print         CONTINUE these medications which have NOT CHANGED    Details   HYDROcodone-acetaminophen (VICODIN) 5-300 MG per tablet Take 1 tablet by mouth every 6 (six) hours as needed for moderate pain, Historical Med      Omega-3 Fatty Acids (FISH OIL PO) Take by mouth 2 (two) times a week, Historical Med      ondansetron (ZOFRAN-ODT) 4 mg disintegrating tablet Take 2 tablets (8 mg total) by mouth every 8 (eight) hours as needed for nausea or vomiting, Starting Mon 3/15/2021, Normal           No discharge procedures on file      PDMP Review     None          ED Provider  Electronically Signed by           Trevor Akins MD  04/09/21 2037

## 2021-03-30 LAB
ATRIAL RATE: 75 BPM
P AXIS: 66 DEGREES
PR INTERVAL: 118 MS
QRS AXIS: 92 DEGREES
QRSD INTERVAL: 94 MS
QT INTERVAL: 386 MS
QTC INTERVAL: 431 MS
T WAVE AXIS: 68 DEGREES
VENTRICULAR RATE: 75 BPM

## 2021-03-30 PROCEDURE — 93010 ELECTROCARDIOGRAM REPORT: CPT | Performed by: INTERNAL MEDICINE

## 2023-02-27 ENCOUNTER — APPOINTMENT (OUTPATIENT)
Dept: LAB | Facility: CLINIC | Age: 43
End: 2023-02-27

## 2023-02-27 ENCOUNTER — OFFICE VISIT (OUTPATIENT)
Dept: FAMILY MEDICINE CLINIC | Facility: CLINIC | Age: 43
End: 2023-02-27

## 2023-02-27 VITALS
WEIGHT: 152 LBS | DIASTOLIC BLOOD PRESSURE: 80 MMHG | HEART RATE: 63 BPM | SYSTOLIC BLOOD PRESSURE: 128 MMHG | HEIGHT: 68 IN | TEMPERATURE: 97.4 F | BODY MASS INDEX: 23.04 KG/M2 | OXYGEN SATURATION: 98 %

## 2023-02-27 DIAGNOSIS — Z13.220 SCREENING FOR LIPID DISORDERS: ICD-10-CM

## 2023-02-27 DIAGNOSIS — Z13.1 SCREENING FOR DIABETES MELLITUS: ICD-10-CM

## 2023-02-27 DIAGNOSIS — M75.21 BICEPS TENDINITIS OF RIGHT UPPER EXTREMITY: Primary | ICD-10-CM

## 2023-02-27 LAB
ALBUMIN SERPL BCP-MCNC: 4.2 G/DL (ref 3.5–5)
ALP SERPL-CCNC: 61 U/L (ref 46–116)
ALT SERPL W P-5'-P-CCNC: 28 U/L (ref 12–78)
ANION GAP SERPL CALCULATED.3IONS-SCNC: 4 MMOL/L (ref 4–13)
AST SERPL W P-5'-P-CCNC: 14 U/L (ref 5–45)
BILIRUB SERPL-MCNC: 0.8 MG/DL (ref 0.2–1)
BUN SERPL-MCNC: 20 MG/DL (ref 5–25)
CALCIUM SERPL-MCNC: 10.3 MG/DL (ref 8.3–10.1)
CHLORIDE SERPL-SCNC: 109 MMOL/L (ref 96–108)
CHOLEST SERPL-MCNC: 258 MG/DL
CO2 SERPL-SCNC: 27 MMOL/L (ref 21–32)
CREAT SERPL-MCNC: 1.04 MG/DL (ref 0.6–1.3)
GFR SERPL CREATININE-BSD FRML MDRD: 88 ML/MIN/1.73SQ M
GLUCOSE P FAST SERPL-MCNC: 89 MG/DL (ref 65–99)
HDLC SERPL-MCNC: 67 MG/DL
LDLC SERPL CALC-MCNC: 183 MG/DL (ref 0–100)
POTASSIUM SERPL-SCNC: 4 MMOL/L (ref 3.5–5.3)
PROT SERPL-MCNC: 6.8 G/DL (ref 6.4–8.4)
SODIUM SERPL-SCNC: 140 MMOL/L (ref 135–147)
TRIGL SERPL-MCNC: 42 MG/DL

## 2023-02-27 NOTE — PROGRESS NOTES
Name: Briseida Smith      : 1980      MRN: [id number]  Encounter Provider: Atul Palmer PA-C  Encounter Date: 2023   Encounter department: [address] [place] [address]     1  Biceps tendinitis of right upper extremity  -     Ambulatory Referral to Physical Therapy; Future    2  Screening for lipid disorders  -     Lipid Panel with Direct LDL reflex; Future    3  Screening for diabetes mellitus  -     Comprehensive metabolic panel; Future  his exam today is essentially normal  No pain elicited on exam  Normal shoulder exam without signs of impingement, rotator cuff arthropathy  Mild discomfort in the area of concern with certain movements of the RUE however ROM, strength, sensation intact  Suspect degree of mild biceps tendinitis causing intermittent mild pain  Neck with full ROM without pain  Remainder of exam normal as well  Discussed starting with conservative treatments such as antiinflammatories, rest, PT  If no improvement can consider imaging after 4-6 weeks of PT  Update screening labs  Follow up prn       Subjective     Pt presents to establish care with concerns of R upper arm pain  Started about 3-4 months ago  No specific injuries  Does share it seemed to start around the time of increasing his rope climbing workouts to include more upper arms  Shares the pain is very mild and intermittent  Exacerbated by certain movements  Shares there is no shoulder pain, more over the bicep and lateral upper arm  No neck pain  No weakness  No numbness/tingling or radiation  No other concerns    No significant PMH  No daily medications  FH of renal cancer  Non smoker      Review of Systems   Constitutional: Negative for chills, fatigue and fever  HENT: Negative for congestion, ear pain, hearing loss, nosebleeds, postnasal drip, rhinorrhea, sinus pressure, sinus pain, sneezing and sore throat  Eyes: Negative for pain, discharge, itching and visual disturbance  Respiratory: Negative for cough, chest tightness, shortness of breath and wheezing  Cardiovascular: Negative for chest pain, palpitations and leg swelling  Gastrointestinal: Negative for abdominal pain, blood in stool, constipation, diarrhea, nausea and vomiting  Genitourinary: Negative for frequency and urgency  Musculoskeletal: Positive for arthralgias  Neurological: Negative for dizziness, light-headedness and numbness  Past Medical History:   Diagnosis Date   • Blood in the urine     approx 1 month ago, dark brownish urine, no further blood in urine noted   • Headache     "few times a month"   • Kidney stone    • Penis pain     "tip of " but has lessened, increases rex when voiding,    • PONV (postoperative nausea and vomiting)      Past Surgical History:   Procedure Laterality Date   • PA CYSTO/URETERO W/LITHOTRIPSY &INDWELL STENT INSRT Left 9/8/2017    Procedure: CYSTOSCOPY URETEROSCOPY with stone extraction/fragmentation;  Surgeon: Ángel Mesa MD;  Location: [facility] OR;  Service: Urology   • VESICOSTOMY       History reviewed  No pertinent family history    Social History     Socioeconomic History   • Marital status: /Civil Union     Spouse name: None   • Number of children: None   • Years of education: None   • Highest education level: None   Occupational History   • None   Tobacco Use   • Smoking status: Never   • Smokeless tobacco: Never   Substance and Sexual Activity   • Alcohol use: No   • Drug use: No   • Sexual activity: None   Other Topics Concern   • None   Social History Narrative   • None     Social Determinants of Health     Financial Resource Strain: Not on file   Food Insecurity: Not on file   Transportation Needs: Not on file   Physical Activity: Not on file   Stress: Not on file   Social Connections: Not on file   Intimate Partner Violence: Not on file   Housing Stability: Not on file     Current Outpatient Medications on File Prior to Visit   Medication Sig • [DISCONTINUED] HYDROcodone-acetaminophen (VICODIN) 5-300 MG per tablet Take 1 tablet by mouth every 6 (six) hours as needed for moderate pain   • [DISCONTINUED] naproxen (NAPROSYN) 375 mg tablet Take 1 tablet (375 mg total) by mouth 2 (two) times a day with meals   • [DISCONTINUED] Omega-3 Fatty Acids (FISH OIL PO) Take by mouth 2 (two) times a week   • [DISCONTINUED] ondansetron (ZOFRAN-ODT) 4 mg disintegrating tablet Take 2 tablets (8 mg total) by mouth every 8 (eight) hours as needed for nausea or vomiting     No Known Allergies  Immunization History   Administered Date(s) Administered   • Tdap 10/01/2012       Objective     /80 (BP Location: Left arm, Patient Position: Sitting, Cuff Size: Standard)   Pulse 63   Temp (!) 97 4 °F (36 3 °C)   Ht 5' 8" (1 727 m)   Wt 68 9 kg (152 lb)   SpO2 98%   BMI 23 11 kg/m²     Physical Exam  Vitals and nursing note reviewed  Constitutional:       General: He is not in acute distress  Appearance: Normal appearance  HENT:      Head: Normocephalic and atraumatic  Nose: Nose normal       Mouth/Throat:      Mouth: Mucous membranes are moist       Pharynx: Oropharynx is clear  No oropharyngeal exudate or posterior oropharyngeal erythema  Eyes:      Pupils: Pupils are equal, round, and reactive to light  Cardiovascular:      Rate and Rhythm: Normal rate and regular rhythm  Heart sounds: Normal heart sounds  No murmur heard  Pulmonary:      Effort: Pulmonary effort is normal  No respiratory distress  Breath sounds: Normal breath sounds  No wheezing, rhonchi or rales  Abdominal:      General: Bowel sounds are normal       Palpations: Abdomen is soft  Musculoskeletal:         General: Normal range of motion  Right shoulder: Normal  No tenderness or bony tenderness  Normal range of motion  Right upper arm: No tenderness or bony tenderness  Cervical back: Normal, normal range of motion and neck supple        Comments: No impingement signs of R shoulder  Essentially normal RUE examination   Skin:     General: Skin is warm and dry  Neurological:      Mental Status: He is alert and oriented to person, place, and time  Sensory: No sensory deficit  Motor: No weakness     Psychiatric:         Mood and Affect: Mood and affect normal        Venancio Arenas PA-C

## 2023-03-06 ENCOUNTER — EVALUATION (OUTPATIENT)
Dept: PHYSICAL THERAPY | Facility: CLINIC | Age: 43
End: 2023-03-06

## 2023-03-06 DIAGNOSIS — M75.21 BICEPS TENDINITIS OF RIGHT UPPER EXTREMITY: Primary | ICD-10-CM

## 2023-03-06 NOTE — PROGRESS NOTES
PT Evaluation     Today's date: 3/6/2023  Patient name: Lorrie Martinez  : 1980  MRN: [id number]  Referring provider: Aaron Sanchez PA-C  Dx:   Encounter Diagnosis     ICD-10-CM    1  Biceps tendinitis of right upper extremity  M75 21                      Assessment  Assessment details: Lorrie Martinez is a 43 y o  male referred with primary diagnosis of Biceps tendinitis of right upper extremity  (primary encounter diagnosis)   Patient presents with the following functional limitations: Patient has mild scapular weakness with noted scapular protraction and thoracic kyphosis in sitting  Symptoms alleviated slightly with Trigger point release to right pec minor, subscapularis and latissimus dorsi  Symptoms consisted with impingement syndrome  Treatment to include: Manual therapy techniques, extremity/core strengthening, neuromuscular control exercises, instruction in a comprehensive HEP, and modalities as needed  They will benefit from skilled PT services to address the above functional deficits and to decrease pain to promote a return to their premorbid level of function  Impairments: activity intolerance and pain with function  Functional limitations: Pain with donning/doffing shirt and jacket, throwing football and laying on right side  Understanding of Dx/Px/POC: good   Prognosis: good    Goals  STG (4 weeks)  1  Patient will report pain as a 2-3/10 at worst with throwing a football  2  Patient will demonstrate the ability to correct posture with VC's  3  Patient will report 50% reduction in sleep disturbances related to pain  LTG (8 weeks)  1  Patient will report pain as a 0-1/10 at worst with donning/doffing a shirt and jacket  2  Patient will report the ability to throw a football without pain  3  Patient will independently correct posture as needed to prevent future exacerbations of his pain      Plan  Patient would benefit from: skilled physical therapy  Planned therapy interventions: joint mobilization, manual therapy, neuromuscular re-education, patient education, strengthening, stretching, therapeutic activities, therapeutic exercise and home exercise program  Frequency: 2x week  Duration in weeks: 8  Plan of Care beginning date: 3/6/2023  Plan of Care expiration date: 2023  Treatment plan discussed with: patient        Subjective Evaluation    History of Present Illness  Mechanism of injury: Patient reports intermittent discomfort in right anterolateral shoulder which started 3-4 months ago  Saw his PCP office end of February and is referred now to outpatient PT services  Recurrent probem    Quality of life: good    Pain  Current pain ratin  At best pain ratin  At worst pain ratin  Location: Right lateral brachium  Quality: dull ache  Progression: no change    Social Support  Lives with: spouse and young children (9,5,3 y o )    Employment status: working (  Works remotely  On [employer] all day )  Hand dominance: right  Exercise history: Not currently working out  Diagnostic Tests  No diagnostic tests performed    FCE comments: (-) sleep disturbances  Denies weakness or paresthesias  Still performs all functional activities without pain  Treatments  Previous treatment: medication  Current treatment: medication  Patient Goals  Patient goals for therapy: decreased pain and return to sport/leisure activities          Objective     Static Posture     Shoulders  Rounded  Scapulae  Left protracted and right protracted  Postural Observations  Seated posture: fair  Standing posture: fair  Correction of posture: has no consistent effect        Observations     Right Shoulder  Negative for deformity and edema  Palpation     Right   No palpable tenderness to the biceps, rhomboids, subscapularis and supraspinatus       Tenderness     Right Shoulder  No [facility] joint, acromion, biceps tendon (proximal), bicipital groove, clavicle, coracoid process, subscapularis tendon and supraspinatus tendon  Cervical/Thoracic Screen   Cervical range of motion within normal limits  Thoracic range of motion within normal limits    Active Range of Motion   Left Shoulder   Normal active range of motion    Right Shoulder   Normal active range of motion    Strength/Myotome Testing     Left Shoulder     Planes of Motion   Flexion: 4+   Abduction: 4+   External rotation at 0°: 4+   Internal rotation at 0°: 4+     Right Shoulder     Planes of Motion   Flexion: 4+   Abduction: 4+   External rotation at 0°: 4   Internal rotation at 0°: 4+     Isolated Muscles   Lower trapezius: 4-   Middle trapezius: 3+   Rhomboids: 4-     Tests     Right Shoulder   Positive Neer's  Negative drop arm, empty can, Hawkin's and Speed's  Precautions: None  Access Code: [email or web address]  URL: [email or web address]  Date: 03/06/2023  Prepared by: David Marino 3/6            TPR right pec minor, lats and subscapularis JF            CPA T1-4 Grade III JF                                      Neuro Re-Ed             Pt education Posture  Body mechanics  [address] [facility] anatomy  Impingement sydrome            Web slide rows M,L Instructed            Corner pec stretch  Instructed            Prone I,Y,T Instructed            Prone rows Instructed                                      Ther Ex             UBE             T-band shoulder IR/ER             T-band wall walking             Body Blade S/I at 90 deg flexion and abd               Shoulder press at wall with ER             SL sleeper stretch right                                       Ther Activity                                       Gait Training                                       Modalities

## 2023-03-14 ENCOUNTER — APPOINTMENT (OUTPATIENT)
Dept: PHYSICAL THERAPY | Facility: CLINIC | Age: 43
End: 2023-03-14

## 2023-03-15 ENCOUNTER — APPOINTMENT (OUTPATIENT)
Dept: PHYSICAL THERAPY | Facility: CLINIC | Age: 43
End: 2023-03-15

## 2023-03-15 DIAGNOSIS — M75.21 BICEPS TENDINITIS OF RIGHT UPPER EXTREMITY: Primary | ICD-10-CM

## 2023-03-15 NOTE — PROGRESS NOTES
Daily Note     Today's date: 3/15/2023  Patient name: Makenna Dupree  : 1980  MRN: [id number]  Referring provider: Terrance Granados PA-C  Dx:   Encounter Diagnosis     ICD-10-CM    1  Biceps tendinitis of right upper extremity  M75 21                      Subjective: ***      Objective: See treatment diary below      Assessment: Tolerated treatment {Tolerated treatment :}  Patient {assessment:}      Plan: {PLAN:[id number]}      Precautions: None  Access Code: [email or web address]  URL: [email or web address]  Date: 2023  Prepared by: Cruz Richardson 3/6            TPR right pec minor, lats and subscapularis JF            CPA T1-4 Grade III JF                                      Neuro Re-Ed             Pt education Posture  Body mechanics  [address] [facility] anatomy  Impingement sydrome            Web slide rows M,L Instructed            Corner pec stretch  Instructed            Prone I,Y,T Instructed            Prone rows Instructed                                      Ther Ex             UBE             T-band shoulder IR/ER             T-band wall walking             Body Blade S/I at 90 deg flexion and abd               Shoulder press at wall with ER             SL sleeper stretch right                                       Ther Activity                                       Gait Training                                       Modalities

## 2023-03-23 ENCOUNTER — APPOINTMENT (OUTPATIENT)
Dept: PHYSICAL THERAPY | Facility: CLINIC | Age: 43
End: 2023-03-23

## 2023-03-27 NOTE — PROGRESS NOTES
"Daily Note     Today's date: 3/27/2023  Patient name: Song Samaniego  : 1980  MRN: [id number]  Referring provider: Kathleen Bowman PA-C  Dx:   Encounter Diagnosis     ICD-10-CM    1  Biceps tendinitis of right upper extremity  M75 21                      Subjective: Patient has stopped doing pec stretch because it causes pain in his shoulder  Now doing HEP every other day because he has mild increased shoulder pain the day afterward  Objective: See treatment diary below      Assessment: Tolerated treatment well  Patient would benefit from continued PT  Able to perform pec stretch on foam roller without pain today  Some pain with t-band IR to end-range across body  Plan: Continue per plan of care  Precautions: None  Access Code: [email or web address]  URL: [email or web address]  Date: 2023  Prepared by: Sanjay Denis       Manuals 3/6 3/28           TPR right pec minor, lats and subscapularis JF JF pec minor           CPA T1-4 Grade III JF JF                                     Neuro Re-Ed             Pt education Posture  Body mechanics  [facility] anatomy  Impingement sydrome            Web slide rows M,L Instructed BTB 2x10           Corner pec stretch  Instructed            Prone I,Y,T Instructed            Prone rows Instructed                                      Ther Ex             UBE  L1 4'/4'           T-band shoulder IR  BTB 2x10           T-band shoulder ER  BTB 2x10           T-band wall walking             Body Blade S/I at 90 deg flexion and abd               Shoulder press at wall with ER  5# 1x10, 1x5           SL sleeper stretch right  standing 3x30\"                                     Ther Activity                                       Gait Training                                       Modalities                                            "

## 2023-03-28 ENCOUNTER — OFFICE VISIT (OUTPATIENT)
Dept: PHYSICAL THERAPY | Facility: CLINIC | Age: 43
End: 2023-03-28

## 2023-03-28 DIAGNOSIS — M75.21 BICEPS TENDINITIS OF RIGHT UPPER EXTREMITY: Primary | ICD-10-CM

## 2023-04-03 NOTE — PROGRESS NOTES
PT Discharge    Today's date: 4/3/2023  Patient name: Eulas Goodpasture  : 1980  MRN: [id number]  Referring provider: Moses Morales PA-C  Dx:   Encounter Diagnosis     ICD-10-CM    1  Biceps tendinitis of right upper extremity  M75 21                      Assessment  Assessment details: Patient has been seen in outpatient PT and performing his HEP over the past 4 weeks  In that time, he denies changes in his pain intensity, frequency, or duration of symptoms  He continues to have pain throughout the day which limits his ability to throw a ball and lay on his side  He demonstrates normal right shoulder strength and ROM  Objective testing suggests impingement syndrome or possible labral derangement  Patient has maximized the benefit of skilled PT services at this time, but would benefit from orthopedic consult and possible diagnostic work up  Impairments: activity intolerance and pain with function  Functional limitations: Pain with donning/doffing shirt and jacket, throwing football and laying on right side  Understanding of Dx/Px/POC: good   Prognosis: good    Goals  STG (4 weeks)  1  Patient will report pain as a 2-3/10 at worst with throwing a football - not met  2  Patient will demonstrate the ability to correct posture with VC's- met  3  Patient will report 50% reduction in sleep disturbances related to pain - not met  LTG (8 weeks)  1  Patient will report pain as a 0-1/10 at worst with donning/doffing a shirt and jacket - not met  2  Patient will report the ability to throw a football without pain - not met  3  Patient will independently correct posture as needed to prevent future exacerbations of his pain  - met    Plan  Planned therapy interventions: patient education and home exercise program  Plan of Care beginning date: 3/6/2023  Plan of Care expiration date: 2023  Treatment plan discussed with: patient        Subjective Evaluation    History of Present Illness  Mechanism of injury: Patient has been seen for one treatment session since his initial evaluation in PT and has been consistently performing his HEP over the past 4 weeks  He denies any changes in his symptoms to date  Frequency, duration and intensity of pain is unchanged  Gets pain in shoulder about 20x/day  Symptoms are brief and resolve with return to neutral position  Recurrent probem    Quality of life: good    Pain  Current pain ratin  At best pain ratin  At worst pain ratin  Location: Right lateral brachium  Quality: dull ache  Progression: no change    Social Support  Lives with: spouse and young children (9,5,3 y o )    Employment status: working (  Works remotely  On [employer] all day )  Hand dominance: right  Exercise history: Not currently working out  Diagnostic Tests  No diagnostic tests performed    FCE comments: (-) sleep disturbances  Denies weakness or paresthesias  Still performs all functional activities with pain  Treatments  Previous treatment: medication  Current treatment: medication  Patient Goals  Patient goals for therapy: decreased pain and return to sport/leisure activities          Objective     Static Posture     Shoulders  Rounded  Scapulae  Left protracted and right protracted  Postural Observations  Seated posture: fair  Standing posture: fair  Correction of posture: has no consistent effect        Observations     Right Shoulder  Negative for deformity and edema  Palpation     Right   No palpable tenderness to the biceps, rhomboids, subscapularis and supraspinatus  Tenderness     Right Shoulder  No tenderness in the [facility] joint, acromion, biceps tendon (proximal), bicipital groove, clavicle, coracoid process, subscapularis tendon and supraspinatus tendon       Cervical/Thoracic Screen   Cervical range of motion within normal limits  Thoracic range of motion within normal limits    Active Range of Motion   Left Shoulder   Normal active range "of motion    Right Shoulder   Normal active range of motion    Strength/Myotome Testing     Left Shoulder     Planes of Motion   Flexion: 4+   Abduction: 4+   External rotation at 0°: 4+   Internal rotation at 0°: 4+     Right Shoulder     Planes of Motion   Flexion: 4+   Abduction: 4+   External rotation at 0°: 4   Internal rotation at 0°: 4+     Isolated Muscles   Lower trapezius: 4-   Middle trapezius: 4-   Rhomboids: 4-     Tests     Right Shoulder   Positive active compression ([place]) and empty can  Negative drop arm, Hawkin's and Speed's  Precautions: None  Access Code: [email or web address]  URL: [email or web address]  Date: 03/06/2023  Prepared by: Rudy Mill       Manuals 3/6 3/28           TPR right pec minor, lats and subscapularis JF JF pec minor           CPA T1-4 Grade III JF JF                                     Neuro Re-Ed             Pt education Posture  Body mechanics  [facility] anatomy  Impingement sydrome            Web slide rows M,L Instructed BTB 2x10           Corner pec stretch  Instructed            Prone I,Y,T Instructed            Prone rows Instructed                                      Ther Ex             UBE  L1 4'/4'           T-band shoulder IR  BTB 2x10           T-band shoulder ER  BTB 2x10           T-band wall walking             Body Blade S/I at 90 deg flexion and abd               Shoulder press at wall with ER  5# 1x10, 1x5           SL sleeper stretch right  standing 3x30\"                                     Ther Activity                                       Gait Training                                       Modalities                                               "

## 2023-04-04 ENCOUNTER — EVALUATION (OUTPATIENT)
Dept: PHYSICAL THERAPY | Facility: CLINIC | Age: 43
End: 2023-04-04

## 2023-04-04 DIAGNOSIS — M75.21 BICEPS TENDINITIS OF RIGHT UPPER EXTREMITY: Primary | ICD-10-CM

## 2024-10-31 ENCOUNTER — APPOINTMENT (OUTPATIENT)
Dept: LAB | Facility: CLINIC | Age: 44
End: 2024-10-31
Payer: COMMERCIAL

## 2024-10-31 ENCOUNTER — OFFICE VISIT (OUTPATIENT)
Dept: FAMILY MEDICINE CLINIC | Facility: CLINIC | Age: 44
End: 2024-10-31
Payer: COMMERCIAL

## 2024-10-31 VITALS
OXYGEN SATURATION: 98 % | HEART RATE: 60 BPM | DIASTOLIC BLOOD PRESSURE: 82 MMHG | BODY MASS INDEX: 24.25 KG/M2 | SYSTOLIC BLOOD PRESSURE: 128 MMHG | TEMPERATURE: 97.2 F | HEIGHT: 68 IN | WEIGHT: 160 LBS

## 2024-10-31 DIAGNOSIS — R10.31 RLQ ABDOMINAL PAIN: ICD-10-CM

## 2024-10-31 DIAGNOSIS — R10.31 RLQ ABDOMINAL PAIN: Primary | ICD-10-CM

## 2024-10-31 DIAGNOSIS — Z13.1 SCREENING FOR DIABETES MELLITUS: ICD-10-CM

## 2024-10-31 DIAGNOSIS — Z13.220 SCREENING FOR LIPID DISORDERS: ICD-10-CM

## 2024-10-31 LAB
ALBUMIN SERPL BCG-MCNC: 4.4 G/DL (ref 3.5–5)
ALP SERPL-CCNC: 71 U/L (ref 34–104)
ALT SERPL W P-5'-P-CCNC: 23 U/L (ref 7–52)
ANION GAP SERPL CALCULATED.3IONS-SCNC: 12 MMOL/L (ref 4–13)
AST SERPL W P-5'-P-CCNC: 22 U/L (ref 13–39)
BASOPHILS # BLD AUTO: 0.05 THOUSANDS/ΜL (ref 0–0.1)
BASOPHILS NFR BLD AUTO: 1 % (ref 0–1)
BILIRUB SERPL-MCNC: 0.7 MG/DL (ref 0.2–1)
BUN SERPL-MCNC: 25 MG/DL (ref 5–25)
CALCIUM SERPL-MCNC: 9.9 MG/DL (ref 8.4–10.2)
CHLORIDE SERPL-SCNC: 103 MMOL/L (ref 96–108)
CHOLEST SERPL-MCNC: 312 MG/DL
CO2 SERPL-SCNC: 26 MMOL/L (ref 21–32)
CREAT SERPL-MCNC: 1.27 MG/DL (ref 0.6–1.3)
EOSINOPHIL # BLD AUTO: 0.08 THOUSAND/ΜL (ref 0–0.61)
EOSINOPHIL NFR BLD AUTO: 2 % (ref 0–6)
ERYTHROCYTE [DISTWIDTH] IN BLOOD BY AUTOMATED COUNT: 12.9 % (ref 11.6–15.1)
GFR SERPL CREATININE-BSD FRML MDRD: 68 ML/MIN/1.73SQ M
GLUCOSE P FAST SERPL-MCNC: 86 MG/DL (ref 65–99)
HCT VFR BLD AUTO: 45.4 % (ref 36.5–49.3)
HDLC SERPL-MCNC: 84 MG/DL
HGB BLD-MCNC: 14.6 G/DL (ref 12–17)
IMM GRANULOCYTES # BLD AUTO: 0 THOUSAND/UL (ref 0–0.2)
IMM GRANULOCYTES NFR BLD AUTO: 0 % (ref 0–2)
LDLC SERPL CALC-MCNC: 218 MG/DL (ref 0–100)
LYMPHOCYTES # BLD AUTO: 1.65 THOUSANDS/ΜL (ref 0.6–4.47)
LYMPHOCYTES NFR BLD AUTO: 34 % (ref 14–44)
MCH RBC QN AUTO: 29.3 PG (ref 26.8–34.3)
MCHC RBC AUTO-ENTMCNC: 32.2 G/DL (ref 31.4–37.4)
MCV RBC AUTO: 91 FL (ref 82–98)
MONOCYTES # BLD AUTO: 0.5 THOUSAND/ΜL (ref 0.17–1.22)
MONOCYTES NFR BLD AUTO: 10 % (ref 4–12)
NEUTROPHILS # BLD AUTO: 2.55 THOUSANDS/ΜL (ref 1.85–7.62)
NEUTS SEG NFR BLD AUTO: 53 % (ref 43–75)
NRBC BLD AUTO-RTO: 0 /100 WBCS
PLATELET # BLD AUTO: 182 THOUSANDS/UL (ref 149–390)
PMV BLD AUTO: 10.9 FL (ref 8.9–12.7)
POTASSIUM SERPL-SCNC: 3.9 MMOL/L (ref 3.5–5.3)
PROT SERPL-MCNC: 7.1 G/DL (ref 6.4–8.4)
RBC # BLD AUTO: 4.99 MILLION/UL (ref 3.88–5.62)
SODIUM SERPL-SCNC: 141 MMOL/L (ref 135–147)
TRIGL SERPL-MCNC: 51 MG/DL
WBC # BLD AUTO: 4.83 THOUSAND/UL (ref 4.31–10.16)

## 2024-10-31 PROCEDURE — 85025 COMPLETE CBC W/AUTO DIFF WBC: CPT

## 2024-10-31 PROCEDURE — 36415 COLL VENOUS BLD VENIPUNCTURE: CPT

## 2024-10-31 PROCEDURE — 99214 OFFICE O/P EST MOD 30 MIN: CPT | Performed by: PHYSICIAN ASSISTANT

## 2024-10-31 PROCEDURE — 80061 LIPID PANEL: CPT

## 2024-10-31 PROCEDURE — 80053 COMPREHEN METABOLIC PANEL: CPT

## 2024-10-31 NOTE — PROGRESS NOTES
Ambulatory Visit  Name: Frank Bustos      : 1980      MRN: [id number]  Encounter Provider: Rigoberto Lane PA-C  Encounter Date: 10/31/2024   Encounter department: [facility]    Assessment & Plan  RLQ abdominal pain  Unclear etiology. No herniation appreciated on exam. No mass or reproducible TTP or guarding. No red flag signs. Recommend labs and nonemergent CT. Follow up if sx persist or worsen   Orders:    CT abdomen pelvis w contrast; Future    Comprehensive metabolic panel; Future    CBC and differential; Future    Screening for lipid disorders    Orders:    Lipid Panel with Direct LDL reflex; Future    Screening for diabetes mellitus    Orders:    Comprehensive metabolic panel; Future         History of Present Illness     Pt presents with cocnerns of dull aching RLQ abdominal pain worse with change in activity and exercise. No changes in BMs. No black/bloody stools. No  sx. No N/V/D. No fevers. He shares he may also feel some dull R groin/testicular pain    Abdominal Pain  This is a new problem. The current episode started 1 to 4 weeks ago. The onset quality is gradual. The problem occurs 2 to 4 times per day. The most recent episode lasted 1 hours. The problem has been gradually worsening. The pain is located in the RLQ. The pain is at a severity of 2/10. The quality of the pain is dull. The abdominal pain does not radiate. Pertinent negatives include no anorexia, arthralgias, belching, constipation, diarrhea, dysuria, fever, flatus, frequency, headaches, hematochezia, hematuria, melena, myalgias, nausea, vomiting or weight loss. The pain is aggravated by certain positions. The pain is relieved by Being still.     Review of Systems   Constitutional:  Negative for chills, fatigue, fever and weight loss.   HENT:  Negative for congestion, ear pain, hearing loss, nosebleeds, postnasal drip, rhinorrhea, sinus pressure, sinus pain, sneezing and sore throat.    Eyes:  Negative  "for pain, discharge, itching and visual disturbance.   Respiratory:  Negative for cough, chest tightness, shortness of breath and wheezing.    Cardiovascular:  Negative for chest pain, palpitations and leg swelling.   Gastrointestinal:  Positive for abdominal pain. Negative for anorexia, blood in stool, constipation, diarrhea, flatus, hematochezia, melena, nausea and vomiting.   Genitourinary:  Negative for dysuria, frequency, hematuria and urgency.   Musculoskeletal:  Negative for arthralgias and myalgias.   Neurological:  Negative for dizziness, light-headedness, numbness and headaches.     Past Medical History:   Diagnosis Date    Blood in the urine     approx 1 month ago, dark brownish urine, no further blood in urine noted    Headache     \"few times a month\"    Kidney stone     Penis pain     \"tip of \" but has lessened, increases rex when voiding,     PONV (postoperative nausea and vomiting)      Past Surgical History:   Procedure Laterality Date    [place] CYSTO/URETERO W/LITHOTRIPSY &INDWELL STENT INSRT Left 9/8/2017    Procedure: CYSTOSCOPY URETEROSCOPY with stone extraction/fragmentation;  Surgeon: Wong Jackson MD;  Location: [facility];  Service: Urology    VESICOSTOMY       No family history on file.  Social History     Tobacco Use    Smoking status: Never    Smokeless tobacco: Never   Substance and Sexual Activity    Alcohol use: No    Drug use: No    Sexual activity: Not on file     No current outpatient medications on file prior to visit.     No Known Allergies  Immunization History   Administered Date(s) Administered    Tdap 10/01/2012     Objective     /82   Pulse 60   Temp (!) 97.2 °F (36.2 °C)   Ht 5' 8\" (1.727 m)   Wt 72.6 kg (160 lb)   SpO2 98%   BMI 24.33 kg/m²     Physical Exam  Vitals and nursing note reviewed.   Constitutional:       General: He is not in acute distress.     Appearance: He is well-developed.   HENT:      Head: Normocephalic and atraumatic.   Eyes:      " Conjunctiva/sclera: Conjunctivae normal.   Cardiovascular:      Rate and Rhythm: Normal rate and regular rhythm.      Heart sounds: No murmur heard.  Pulmonary:      Effort: Pulmonary effort is normal. No respiratory distress.      Breath sounds: Normal breath sounds. No wheezing, rhonchi or rales.   Abdominal:      Palpations: Abdomen is soft.      Tenderness: There is no abdominal tenderness. There is no guarding.      Hernia: No hernia is present. There is no hernia in the right inguinal area.   Musculoskeletal:         General: No swelling.      Cervical back: Neck supple.   Skin:     General: Skin is warm and dry.      Capillary Refill: Capillary refill takes less than 2 seconds.   Neurological:      Mental Status: He is alert.   Psychiatric:         Mood and Affect: Mood normal.

## 2024-11-01 DIAGNOSIS — R79.89 ELEVATED SERUM CREATININE: Primary | ICD-10-CM

## 2024-11-06 ENCOUNTER — HOSPITAL ENCOUNTER (OUTPATIENT)
Dept: CT IMAGING | Facility: CLINIC | Age: 44
Discharge: HOME | End: 2024-11-06
Payer: COMMERCIAL

## 2024-11-06 DIAGNOSIS — R10.31 RLQ ABDOMINAL PAIN: ICD-10-CM

## 2024-11-06 PROCEDURE — 74177 CT ABD & PELVIS W/CONTRAST: CPT

## 2024-11-06 RX ADMIN — IOHEXOL 50 ML: 350 INJECTION, SOLUTION INTRAVENOUS at 12:28

## 2024-11-11 ENCOUNTER — TELEPHONE (OUTPATIENT)
Age: 44
End: 2024-11-11

## 2024-11-11 DIAGNOSIS — K40.90 NON-RECURRENT UNILATERAL INGUINAL HERNIA WITHOUT OBSTRUCTION OR GANGRENE: Primary | ICD-10-CM

## 2024-11-11 NOTE — TELEPHONE ENCOUNTER
Patient called in to schedule Gen Surg consult with Dr Thomas for an inguinal hernia.  Patient is scheduled for 11/26 in [place]

## 2024-11-11 NOTE — TELEPHONE ENCOUNTER
Patient would like to discuss his hernia with Rigoberto.  He is waiting on his CT abdomen/pelvis results.  He states the hernia is visible now, it is more popped out and it also is a little sore.  It was not sore previously at his last visit.  He would like to go over the results tomorrow, if available, and to go over what the next steps would be.

## 2024-11-26 ENCOUNTER — CONSULT (OUTPATIENT)
Dept: SURGERY | Facility: CLINIC | Age: 44
End: 2024-11-26
Payer: COMMERCIAL

## 2024-11-26 VITALS
TEMPERATURE: 97.5 F | HEIGHT: 68 IN | HEART RATE: 68 BPM | DIASTOLIC BLOOD PRESSURE: 72 MMHG | OXYGEN SATURATION: 97 % | SYSTOLIC BLOOD PRESSURE: 128 MMHG | WEIGHT: 156.6 LBS | BODY MASS INDEX: 23.73 KG/M2 | RESPIRATION RATE: 18 BRPM

## 2024-11-26 DIAGNOSIS — K40.90 NON-RECURRENT UNILATERAL INGUINAL HERNIA WITHOUT OBSTRUCTION OR GANGRENE: ICD-10-CM

## 2024-11-26 PROCEDURE — 99243 OFF/OP CNSLTJ NEW/EST LOW 30: CPT | Performed by: STUDENT IN AN ORGANIZED HEALTH CARE EDUCATION/TRAINING PROGRAM

## 2024-11-26 RX ORDER — CHLORHEXIDINE GLUCONATE 40 MG/ML
SOLUTION TOPICAL DAILY PRN
OUTPATIENT
Start: 2024-11-26

## 2024-11-26 RX ORDER — HEPARIN SODIUM 5000 [USP'U]/ML
5000 INJECTION, SOLUTION INTRAVENOUS; SUBCUTANEOUS ONCE
OUTPATIENT
Start: 2024-11-26 | End: 2024-11-26

## 2024-11-26 NOTE — PROGRESS NOTES
Name: Frank Bustos      : 1980      MRN: [id number]  Encounter Provider: Diaz Thomas DO  Encounter Date: 2024   Encounter department: [facility] SURG[facility] [place]  :  Assessment & Plan  Non-recurrent unilateral inguinal hernia without obstruction or gangrene  44-year-old male with reducible right inguinal hernia  -Patient presents for evaluation due to right groin discomfort and bulge  -Notes in September he developed right groin discomfort with certain activities, denied any bulge at that time  -Was seen by his Primary care physician and a CT scan was done  -Patient states a few days after the CT scan he then noticed a bulge in his right groin that would come and go  -Notices it pushes out more with straining, coughing, sneezing  -On exam there is a small reducible right inguinal hernia, no obvious inguinal hernia on the left  -CBC and CMP from 10/31/2024 reviewed  -CT scan of the abdomen and pelvis from 2024 report and images reviewed  -Primary care physician note from 10/31/2024 reviewed  -Plan for laparoscopic right inguinal hernia repair with mesh, possible open, possible bilateral    A visual was used to display the anatomy and the proposed incision, procedure, steps, and mesh placement. The risks were explained at length and outlined, not limited to bleeding, infection, recurrence, pain, testicular loss, and damage to other structures. The planned approximately one hour procedure was discussed along with 1 - 2 week recovery, resolution of pain and swelling timeline, and 4 weeks of light duty without lifting. Questions were answered to satisfaction and consent was signed.    Orders:    Ambulatory Referral to General Surgery    Case request operating room: REPAIR HERNIA INGUINAL, LAPAROSCOPIC, Possible Open, Possible Bilateral; Standing        History of Present Illness     HPI  Frank Bustos is a 44 y.o. male who presents for evaluation due to right groin bulge and  "discomfort.  Patient states that in September he noticed some right groin discomfort when doing certain activities and lifting.  He was seen by his Primary care physician and he got a CT scan.  He denied any bulge at that time.  A few days after he got the CT scan he noted a bulge in his right groin that is reducible.  It does cause him intermittent discomfort.  He notes more discomfort with strenuous activity and sneezing and coughing.  He has been tolerating a diet and having bowel function.  History obtained from: patient    Review of Systems   Constitutional:  Negative for chills, fatigue and fever.   HENT:  Negative for congestion, hearing loss, rhinorrhea and sore throat.    Eyes:  Negative for pain and discharge.   Respiratory:  Negative for cough, chest tightness and shortness of breath.    Cardiovascular:  Negative for chest pain and palpitations.   Gastrointestinal:  Positive for abdominal pain. Negative for constipation, diarrhea, nausea and vomiting.        Positive right groin bulge and discomfort   Endocrine: Negative for cold intolerance and heat intolerance.   Genitourinary:  Negative for difficulty urinating and dysuria.   Musculoskeletal:  Negative for back pain and neck pain.   Skin:  Negative for color change and rash.   Allergic/Immunologic: Negative for environmental allergies and food allergies.   Neurological:  Negative for seizures and headaches.   Hematological:  Does not bruise/bleed easily.   Psychiatric/Behavioral:  Negative for confusion and hallucinations.      Past Medical History   Past Medical History:   Diagnosis Date    Blood in the urine     approx 1 month ago, dark brownish urine, no further blood in urine noted    Headache     \"few times a month\"    Kidney stone     Penis pain     \"tip of \" but has lessened, increases rex when voiding,     PONV (postoperative nausea and vomiting)      Past Surgical History:   Procedure Laterality Date    [place] CYSTO/URETERO W/LITHOTRIPSY &INDWELL " "STENT INSRT Left 9/8/2017    Procedure: CYSTOSCOPY URETEROSCOPY with stone extraction/fragmentation;  Surgeon: Wong Jackson MD;  Location: [facility] OR;  Service: Urology    VESICOSTOMY       History reviewed. No pertinent family history.   reports that he has never smoked. He has never been exposed to tobacco smoke. He has never used smokeless tobacco. He reports that he does not drink alcohol and does not use drugs.  No current outpatient medications on file prior to visit.     No current facility-administered medications on file prior to visit.   No Known Allergies   Medical History Reviewed by provider this encounter:  Tobacco  Allergies  Meds  Problems  Med Hx  Surg Hx  Fam Hx     .  Past Medical History   Past Medical History:   Diagnosis Date    Blood in the urine     approx 1 month ago, dark brownish urine, no further blood in urine noted    Headache     \"few times a month\"    Kidney stone     Penis pain     \"tip of \" but has lessened, increases rex when voiding,     PONV (postoperative nausea and vomiting)      Past Surgical History:   Procedure Laterality Date    [place] CYSTO/URETERO W/LITHOTRIPSY &INDWELL STENT INSRT Left 9/8/2017    Procedure: CYSTOSCOPY URETEROSCOPY with stone extraction/fragmentation;  Surgeon: Wong Jackson MD;  Location: [facility] OR;  Service: Urology    VESICOSTOMY       History reviewed. No pertinent family history.   reports that he has never smoked. He has never been exposed to tobacco smoke. He has never used smokeless tobacco. He reports that he does not drink alcohol and does not use drugs.  No current outpatient medications on file prior to visit.     No current facility-administered medications on file prior to visit.   No Known Allergies   No current outpatient medications on file prior to visit.     No current facility-administered medications on file prior to visit.      Social History     Tobacco Use    Smoking status: Never     Passive exposure: Never    " "Smokeless tobacco: Never   Vaping Use    Vaping status: Never Used   Substance and Sexual Activity    Alcohol use: No    Drug use: No    Sexual activity: Not on file        Objective   /72 (BP Location: Left arm, Patient Position: Sitting, Cuff Size: Standard)   Pulse 68   Temp 97.5 °F (36.4 °C)   Resp 18   Ht 5' 8\" (1.727 m)   Wt 71 kg (156 lb 9.6 oz)   SpO2 97%   BMI 23.81 kg/m²      Physical Exam  Constitutional:       Appearance: Normal appearance.   HENT:      Head: Normocephalic and atraumatic.      Nose: Nose normal.   Eyes:      General: No scleral icterus.     Conjunctiva/sclera: Conjunctivae normal.   Cardiovascular:      Rate and Rhythm: Normal rate and regular rhythm.      Heart sounds: Normal heart sounds.   Pulmonary:      Effort: Pulmonary effort is normal.      Breath sounds: Normal breath sounds.   Abdominal:      General: There is no distension.      Palpations: Abdomen is soft.      Tenderness: There is no abdominal tenderness.      Comments: Small reducible right inguinal hernia, no obvious inguinal hernia on the left   Musculoskeletal:         General: No signs of injury.   Skin:     General: Skin is warm.      Coloration: Skin is not jaundiced.   Neurological:      General: No focal deficit present.      Mental Status: He is alert and oriented to person, place, and time.   Psychiatric:         Mood and Affect: Mood normal.         Behavior: Behavior normal.           "

## 2024-11-26 NOTE — ASSESSMENT & PLAN NOTE
44-year-old male with reducible right inguinal hernia  -Patient presents for evaluation due to right groin discomfort and bulge  -Notes in September he developed right groin discomfort with certain activities, denied any bulge at that time  -Was seen by his Primary care physician and a CT scan was done  -Patient states a few days after the CT scan he then noticed a bulge in his right groin that would come and go  -Notices it pushes out more with straining, coughing, sneezing  -On exam there is a small reducible right inguinal hernia, no obvious inguinal hernia on the left  -CBC and CMP from 10/31/2024 reviewed  -CT scan of the abdomen and pelvis from 11/6/2024 report and images reviewed  -Primary care physician note from 10/31/2024 reviewed  -Plan for laparoscopic right inguinal hernia repair with mesh, possible open, possible bilateral    A visual was used to display the anatomy and the proposed incision, procedure, steps, and mesh placement. The risks were explained at length and outlined, not limited to bleeding, infection, recurrence, pain, testicular loss, and damage to other structures. The planned approximately one hour procedure was discussed along with 1 - 2 week recovery, resolution of pain and swelling timeline, and 4 weeks of light duty without lifting. Questions were answered to satisfaction and consent was signed.    Orders:    Ambulatory Referral to General Surgery    Case request operating room: REPAIR HERNIA INGUINAL, LAPAROSCOPIC, Possible Open, Possible Bilateral; Standing

## 2024-12-04 RX ORDER — IBUPROFEN 200 MG
TABLET ORAL EVERY 6 HOURS PRN
COMMUNITY
End: 2024-12-10

## 2024-12-04 NOTE — PRE-PROCEDURE INSTRUCTIONS
Pre-Surgery Instructions:   Medication Instructions    ibuprofen (MOTRIN) 200 mg tablet Stop taking 3 days prior to surgery.    Medication instructions for day surgery reviewed. Please use only a sip of water to take your instructed medications. Avoid all over the counter vitamins, supplements and NSAIDS for one week prior to surgery per anesthesia guidelines. Tylenol is ok to take as needed.     You will receive a call one business day prior to surgery with an arrival time and hospital directions. If your surgery is scheduled on a Monday, the hospital will be calling you on the Friday prior to your surgery. If you have not heard from anyone by 8pm, please call the hospital supervisor through the hospital  at [phone]. ([place] [phone] or [place] [phone]).    Do not eat or drink anything after midnight the night before your surgery, including candy, mints, lifesavers, or chewing gum. Do not drink alcohol 24hrs before your surgery. Try not to smoke at least 24hrs before your surgery.       Follow the pre surgery showering instructions as listed in the “My Surgical Experience Booklet” or otherwise provided by your surgeon's office. Do not use a blade to shave the surgical area 1 week before surgery. It is okay to use a clean electric clippers up to 24 hours before surgery. Do not apply any lotions, creams, including makeup, cologne, deodorant, or perfumes after showering on the day of your surgery. Do not use dry shampoo, hair spray, hair gel, or any type of hair products.     No contact lenses, eye make-up, or artificial eyelashes. Remove nail polish, including gel polish, and any artificial, gel, or acrylic nails if possible. Remove all jewelry including rings and body piercing jewelry.     Wear causal clothing that is easy to take on and off. Consider your type of surgery.    Keep any valuables, jewelry, piercings at home. Please bring any specially ordered equipment (sling, braces) if  indicated.    Arrange for a responsible person to drive you to and from the hospital on the day of your surgery. Please confirm the visitor policy for the day of your procedure when you receive your phone call with an arrival time.     Call the surgeon's office with any new illnesses, exposures, or additional questions prior to surgery.    Please reference your “My Surgical Experience Booklet” for additional information to prepare for your upcoming surgery.

## 2024-12-05 ENCOUNTER — TELEPHONE (OUTPATIENT)
Age: 44
End: 2024-12-05

## 2024-12-05 NOTE — TELEPHONE ENCOUNTER
Spoke with to get more information for provider. He stated that he had spoke with PAT and they wanted him to mention to provider that he has a dentist appt on the 9th and if that would complicate things for him to have surgery. Will discuss in more detail with the provider. Also he just wants to do the right side for now. And wants to discuss recovery with mesh vs having no mesh in place. He was very adamant for provider to call him to discuss with him. It does not have to be today. But would really like a phone call from Dr. Thomas.

## 2024-12-10 ENCOUNTER — OFFICE VISIT (OUTPATIENT)
Dept: SURGERY | Facility: CLINIC | Age: 44
End: 2024-12-10
Payer: COMMERCIAL

## 2024-12-10 VITALS
HEART RATE: 69 BPM | SYSTOLIC BLOOD PRESSURE: 122 MMHG | TEMPERATURE: 97.6 F | OXYGEN SATURATION: 97 % | RESPIRATION RATE: 18 BRPM | HEIGHT: 68 IN | BODY MASS INDEX: 24.1 KG/M2 | DIASTOLIC BLOOD PRESSURE: 72 MMHG | WEIGHT: 159 LBS

## 2024-12-10 DIAGNOSIS — K40.90 NON-RECURRENT UNILATERAL INGUINAL HERNIA WITHOUT OBSTRUCTION OR GANGRENE: Primary | ICD-10-CM

## 2024-12-10 PROCEDURE — 99212 OFFICE O/P EST SF 10 MIN: CPT | Performed by: STUDENT IN AN ORGANIZED HEALTH CARE EDUCATION/TRAINING PROGRAM

## 2024-12-10 NOTE — H&P (VIEW-ONLY)
Name: Frank Bustos      : 1980      MRN: [id number]  Encounter Provider: Diaz Thomas DO  Encounter Date: 12/10/2024   Encounter department: [facility]  :  Assessment & Plan  Non-recurrent unilateral inguinal hernia without obstruction or gangrene  44-year-old male with reducible right inguinal hernia  -Patient presents to discuss inguinal hernia surgery  -Notes that the right groin has been causing him discomfort more often, mostly with standing and walking, when he sits down the discomfort is relieved  -Patient would like to discuss mesh versus primary repair  -Recommendation would be for laparoscopic right inguinal hernia repair with mesh to decrease risk of recurrence, no significant additional risks noted compared to primary repair  -Patient also states he would just like the right side done it would not like to be evaluated for any occult hernia on the left  -Plan for laparoscopic right inguinal hernia repair with mesh, possible open  -New consent obtained    A visual was used to display the anatomy and the proposed incision, procedure, steps, and mesh placement. The risks were explained at length and outlined, not limited to bleeding, infection, recurrence, pain, testicular loss, and damage to other structures. The planned approximately one hour procedure was discussed along with 1 - 2 week recovery, resolution of pain and swelling timeline, and 4 weeks of light duty without lifting. Questions were answered to satisfaction and consent was signed.               History of Present Illness   HPI  Frank Bustos is a 44 y.o. male who presents for evaluation and discussion for inguinal hernia repair.  He notes that he has had slight increase in the right groin discomfort, especially with walking and standing.  This is relieved by sitting down.  History obtained from: patient    Review of Systems   Constitutional:  Negative for chills, fatigue and fever.   HENT:   "Negative for congestion, hearing loss, rhinorrhea and sore throat.    Eyes:  Negative for pain and discharge.   Respiratory:  Negative for cough, chest tightness and shortness of breath.    Cardiovascular:  Negative for chest pain and palpitations.   Gastrointestinal:  Negative for abdominal pain, constipation, diarrhea, nausea and vomiting.        Positive right groin pain and bulge   Endocrine: Negative for cold intolerance and heat intolerance.   Genitourinary:  Negative for difficulty urinating and dysuria.   Musculoskeletal:  Negative for back pain and neck pain.   Skin:  Negative for color change and rash.   Allergic/Immunologic: Negative for environmental allergies and food allergies.   Neurological:  Negative for seizures and headaches.   Hematological:  Does not bruise/bleed easily.   Psychiatric/Behavioral:  Negative for confusion and hallucinations.      Medical History Reviewed by provider this encounter:  Tobacco  Allergies  Meds  Problems  Med Hx  Surg Hx  Fam Hx     .  Past Medical History   Past Medical History:   Diagnosis Date    Blood in the urine     approx 7 yrs ago with kidney stone-dark brownish urine, no further blood in urine noted    Dental cavities     appt for fillings at the dentist 12/9/24 and pt to call surg office today to notify    Exercises daily     Headache     \"few times a month\"    Kidney stone     approx 7 yrs ago    Motion sickness     \"carsickness\"    Penis pain     \"tip of \" but has lessened, increases rex when voiding,     PONV (postoperative nausea and vomiting)     Right inguinal hernia      Past Surgical History:   Procedure Laterality Date    [place] CYSTO/URETERO W/LITHOTRIPSY &INDWELL STENT INSRT Left 09/08/2017    Procedure: CYSTOSCOPY URETEROSCOPY with stone extraction/fragmentation;  Surgeon: Wong Jackson MD;  Location: [facility] OR;  Service: Urology    VESICOSTOMY      \"varicocele-surgery--as a teenager\"     History reviewed. No pertinent family history.   " "reports that he has never smoked. He has never used smokeless tobacco. He reports that he does not drink alcohol and does not use drugs.  Current Outpatient Medications on File Prior to Visit   Medication Sig Dispense Refill    [DISCONTINUED] ibuprofen (MOTRIN) 200 mg tablet Take by mouth every 6 (six) hours as needed for mild pain       No current facility-administered medications on file prior to visit.   No Known Allergies   Current Outpatient Medications on File Prior to Visit   Medication Sig Dispense Refill    [DISCONTINUED] ibuprofen (MOTRIN) 200 mg tablet Take by mouth every 6 (six) hours as needed for mild pain       No current facility-administered medications on file prior to visit.      Social History     Tobacco Use    Smoking status: Never    Smokeless tobacco: Never   Vaping Use    Vaping status: Never Used   Substance and Sexual Activity    Alcohol use: No    Drug use: No    Sexual activity: Not on file     Comment: defer        Objective   /72 (BP Location: Left arm, Patient Position: Sitting, Cuff Size: Standard)   Pulse 69   Temp 97.6 °F (36.4 °C)   Resp 18   Ht 5' 8\" (1.727 m)   Wt 72.1 kg (159 lb)   SpO2 97%   BMI 24.18 kg/m²      Physical Exam  Constitutional:       Appearance: Normal appearance.   HENT:      Head: Normocephalic and atraumatic.      Nose: Nose normal.   Eyes:      General: No scleral icterus.     Conjunctiva/sclera: Conjunctivae normal.   Cardiovascular:      Rate and Rhythm: Normal rate and regular rhythm.      Heart sounds: Normal heart sounds.   Pulmonary:      Effort: Pulmonary effort is normal.      Breath sounds: Normal breath sounds.   Abdominal:      Comments: Reducible right inguinal hernia   Musculoskeletal:         General: No signs of injury.   Skin:     General: Skin is warm.      Coloration: Skin is not jaundiced.   Neurological:      General: No focal deficit present.      Mental Status: He is alert and oriented to person, place, and time. "   Psychiatric:         Mood and Affect: Mood normal.         Behavior: Behavior normal.

## 2024-12-10 NOTE — ASSESSMENT & PLAN NOTE
44-year-old male with reducible right inguinal hernia  -Patient presents to discuss inguinal hernia surgery  -Notes that the right groin has been causing him discomfort more often, mostly with standing and walking, when he sits down the discomfort is relieved  -Patient would like to discuss mesh versus primary repair  -Recommendation would be for laparoscopic right inguinal hernia repair with mesh to decrease risk of recurrence, no significant additional risks noted compared to primary repair  -Patient also states he would just like the right side done it would not like to be evaluated for any occult hernia on the left  -Plan for laparoscopic right inguinal hernia repair with mesh, possible open  -New consent obtained    A visual was used to display the anatomy and the proposed incision, procedure, steps, and mesh placement. The risks were explained at length and outlined, not limited to bleeding, infection, recurrence, pain, testicular loss, and damage to other structures. The planned approximately one hour procedure was discussed along with 1 - 2 week recovery, resolution of pain and swelling timeline, and 4 weeks of light duty without lifting. Questions were answered to satisfaction and consent was signed.

## 2024-12-10 NOTE — PROGRESS NOTES
Name: Frank Bustos      : 1980      MRN: [id number]  Encounter Provider: Diaz Thomas DO  Encounter Date: 12/10/2024   Encounter department: [facility]  :  Assessment & Plan  Non-recurrent unilateral inguinal hernia without obstruction or gangrene  44-year-old male with reducible right inguinal hernia  -Patient presents to discuss inguinal hernia surgery  -Notes that the right groin has been causing him discomfort more often, mostly with standing and walking, when he sits down the discomfort is relieved  -Patient would like to discuss mesh versus primary repair  -Recommendation would be for laparoscopic right inguinal hernia repair with mesh to decrease risk of recurrence, no significant additional risks noted compared to primary repair  -Patient also states he would just like the right side done it would not like to be evaluated for any occult hernia on the left  -Plan for laparoscopic right inguinal hernia repair with mesh, possible open  -New consent obtained    A visual was used to display the anatomy and the proposed incision, procedure, steps, and mesh placement. The risks were explained at length and outlined, not limited to bleeding, infection, recurrence, pain, testicular loss, and damage to other structures. The planned approximately one hour procedure was discussed along with 1 - 2 week recovery, resolution of pain and swelling timeline, and 4 weeks of light duty without lifting. Questions were answered to satisfaction and consent was signed.               History of Present Illness   HPI  Frank Bustos is a 44 y.o. male who presents for evaluation and discussion for inguinal hernia repair.  He notes that he has had slight increase in the right groin discomfort, especially with walking and standing.  This is relieved by sitting down.  History obtained from: patient    Review of Systems   Constitutional:  Negative for chills, fatigue and fever.   HENT:   "Negative for congestion, hearing loss, rhinorrhea and sore throat.    Eyes:  Negative for pain and discharge.   Respiratory:  Negative for cough, chest tightness and shortness of breath.    Cardiovascular:  Negative for chest pain and palpitations.   Gastrointestinal:  Negative for abdominal pain, constipation, diarrhea, nausea and vomiting.        Positive right groin pain and bulge   Endocrine: Negative for cold intolerance and heat intolerance.   Genitourinary:  Negative for difficulty urinating and dysuria.   Musculoskeletal:  Negative for back pain and neck pain.   Skin:  Negative for color change and rash.   Allergic/Immunologic: Negative for environmental allergies and food allergies.   Neurological:  Negative for seizures and headaches.   Hematological:  Does not bruise/bleed easily.   Psychiatric/Behavioral:  Negative for confusion and hallucinations.      Medical History Reviewed by provider this encounter:  Tobacco  Allergies  Meds  Problems  Med Hx  Surg Hx  Fam Hx     .  Past Medical History   Past Medical History:   Diagnosis Date    Blood in the urine     approx 7 yrs ago with kidney stone-dark brownish urine, no further blood in urine noted    Dental cavities     appt for fillings at the dentist 12/9/24 and pt to call surg office today to notify    Exercises daily     Headache     \"few times a month\"    Kidney stone     approx 7 yrs ago    Motion sickness     \"carsickness\"    Penis pain     \"tip of \" but has lessened, increases rex when voiding,     PONV (postoperative nausea and vomiting)     Right inguinal hernia      Past Surgical History:   Procedure Laterality Date    [place] CYSTO/URETERO W/LITHOTRIPSY &INDWELL STENT INSRT Left 09/08/2017    Procedure: CYSTOSCOPY URETEROSCOPY with stone extraction/fragmentation;  Surgeon: Wong Jackson MD;  Location: [facility] OR;  Service: Urology    VESICOSTOMY      \"varicocele-surgery--as a teenager\"     History reviewed. No pertinent family history.   " "reports that he has never smoked. He has never used smokeless tobacco. He reports that he does not drink alcohol and does not use drugs.  Current Outpatient Medications on File Prior to Visit   Medication Sig Dispense Refill    [DISCONTINUED] ibuprofen (MOTRIN) 200 mg tablet Take by mouth every 6 (six) hours as needed for mild pain       No current facility-administered medications on file prior to visit.   No Known Allergies   Current Outpatient Medications on File Prior to Visit   Medication Sig Dispense Refill    [DISCONTINUED] ibuprofen (MOTRIN) 200 mg tablet Take by mouth every 6 (six) hours as needed for mild pain       No current facility-administered medications on file prior to visit.      Social History     Tobacco Use    Smoking status: Never    Smokeless tobacco: Never   Vaping Use    Vaping status: Never Used   Substance and Sexual Activity    Alcohol use: No    Drug use: No    Sexual activity: Not on file     Comment: defer        Objective   /72 (BP Location: Left arm, Patient Position: Sitting, Cuff Size: Standard)   Pulse 69   Temp 97.6 °F (36.4 °C)   Resp 18   Ht 5' 8\" (1.727 m)   Wt 72.1 kg (159 lb)   SpO2 97%   BMI 24.18 kg/m²      Physical Exam  Constitutional:       Appearance: Normal appearance.   HENT:      Head: Normocephalic and atraumatic.      Nose: Nose normal.   Eyes:      General: No scleral icterus.     Conjunctiva/sclera: Conjunctivae normal.   Cardiovascular:      Rate and Rhythm: Normal rate and regular rhythm.      Heart sounds: Normal heart sounds.   Pulmonary:      Effort: Pulmonary effort is normal.      Breath sounds: Normal breath sounds.   Abdominal:      Comments: Reducible right inguinal hernia   Musculoskeletal:         General: No signs of injury.   Skin:     General: Skin is warm.      Coloration: Skin is not jaundiced.   Neurological:      General: No focal deficit present.      Mental Status: He is alert and oriented to person, place, and time. "   Psychiatric:         Mood and Affect: Mood normal.         Behavior: Behavior normal.

## 2024-12-13 ENCOUNTER — ANESTHESIA EVENT (OUTPATIENT)
Dept: PERIOP | Facility: HOSPITAL | Age: 44
End: 2024-12-13
Payer: COMMERCIAL

## 2024-12-13 ENCOUNTER — ANESTHESIA (OUTPATIENT)
Dept: PERIOP | Facility: HOSPITAL | Age: 44
End: 2024-12-13
Payer: COMMERCIAL

## 2024-12-13 ENCOUNTER — HOSPITAL ENCOUNTER (OUTPATIENT)
Facility: HOSPITAL | Age: 44
Setting detail: OUTPATIENT SURGERY
Discharge: HOME | End: 2024-12-13
Attending: STUDENT IN AN ORGANIZED HEALTH CARE EDUCATION/TRAINING PROGRAM | Admitting: STUDENT IN AN ORGANIZED HEALTH CARE EDUCATION/TRAINING PROGRAM
Payer: COMMERCIAL

## 2024-12-13 ENCOUNTER — TELEPHONE (OUTPATIENT)
Age: 44
End: 2024-12-13

## 2024-12-13 VITALS
BODY MASS INDEX: 24.06 KG/M2 | TEMPERATURE: 98.1 F | RESPIRATION RATE: 18 BRPM | SYSTOLIC BLOOD PRESSURE: 109 MMHG | HEIGHT: 68 IN | WEIGHT: 158.73 LBS | DIASTOLIC BLOOD PRESSURE: 64 MMHG | HEART RATE: 64 BPM | OXYGEN SATURATION: 99 %

## 2024-12-13 DIAGNOSIS — K40.90 NON-RECURRENT UNILATERAL INGUINAL HERNIA WITHOUT OBSTRUCTION OR GANGRENE: Primary | ICD-10-CM

## 2024-12-13 PROCEDURE — 49650 LAP ING HERNIA REPAIR INIT: CPT | Performed by: STUDENT IN AN ORGANIZED HEALTH CARE EDUCATION/TRAINING PROGRAM

## 2024-12-13 PROCEDURE — C1781 MESH (IMPLANTABLE): HCPCS | Performed by: STUDENT IN AN ORGANIZED HEALTH CARE EDUCATION/TRAINING PROGRAM

## 2024-12-13 PROCEDURE — 49650 LAP ING HERNIA REPAIR INIT: CPT | Performed by: PHYSICIAN ASSISTANT

## 2024-12-13 DEVICE — LAPAROSCOPIC SELF-FIXATING MESH POLYESTER WITH POLYLACTIC ACID GRIPS AND COLLAGEN FILM
Type: IMPLANTABLE DEVICE | Site: INGUINAL | Status: FUNCTIONAL
Brand: PROGRIP

## 2024-12-13 RX ORDER — ROCURONIUM BROMIDE 10 MG/ML
INJECTION, SOLUTION INTRAVENOUS AS NEEDED
Status: DISCONTINUED | OUTPATIENT
Start: 2024-12-13 | End: 2024-12-13

## 2024-12-13 RX ORDER — ALBUTEROL SULFATE 0.83 MG/ML
2.5 SOLUTION RESPIRATORY (INHALATION) ONCE AS NEEDED
Status: DISCONTINUED | OUTPATIENT
Start: 2024-12-13 | End: 2024-12-13 | Stop reason: HOSPADM

## 2024-12-13 RX ORDER — BUPIVACAINE HYDROCHLORIDE 2.5 MG/ML
INJECTION, SOLUTION EPIDURAL; INFILTRATION; INTRACAUDAL AS NEEDED
Status: DISCONTINUED | OUTPATIENT
Start: 2024-12-13 | End: 2024-12-13 | Stop reason: HOSPADM

## 2024-12-13 RX ORDER — SODIUM CHLORIDE, SODIUM LACTATE, POTASSIUM CHLORIDE, CALCIUM CHLORIDE 600; 310; 30; 20 MG/100ML; MG/100ML; MG/100ML; MG/100ML
100 INJECTION, SOLUTION INTRAVENOUS CONTINUOUS
Status: DISCONTINUED | OUTPATIENT
Start: 2024-12-13 | End: 2024-12-13 | Stop reason: HOSPADM

## 2024-12-13 RX ORDER — SODIUM CHLORIDE, SODIUM LACTATE, POTASSIUM CHLORIDE, CALCIUM CHLORIDE 600; 310; 30; 20 MG/100ML; MG/100ML; MG/100ML; MG/100ML
INJECTION, SOLUTION INTRAVENOUS CONTINUOUS PRN
Status: DISCONTINUED | OUTPATIENT
Start: 2024-12-13 | End: 2024-12-13

## 2024-12-13 RX ORDER — ONDANSETRON 2 MG/ML
INJECTION INTRAMUSCULAR; INTRAVENOUS AS NEEDED
Status: DISCONTINUED | OUTPATIENT
Start: 2024-12-13 | End: 2024-12-13

## 2024-12-13 RX ORDER — TRAMADOL HYDROCHLORIDE 50 MG/1
50 TABLET ORAL EVERY 6 HOURS PRN
Qty: 16 TABLET | Refills: 0 | Status: SHIPPED | OUTPATIENT
Start: 2024-12-13 | End: 2024-12-23

## 2024-12-13 RX ORDER — DEXAMETHASONE SODIUM PHOSPHATE 10 MG/ML
INJECTION, SOLUTION INTRAMUSCULAR; INTRAVENOUS AS NEEDED
Status: DISCONTINUED | OUTPATIENT
Start: 2024-12-13 | End: 2024-12-13

## 2024-12-13 RX ORDER — LABETALOL HYDROCHLORIDE 5 MG/ML
5 INJECTION, SOLUTION INTRAVENOUS
Status: DISCONTINUED | OUTPATIENT
Start: 2024-12-13 | End: 2024-12-13 | Stop reason: HOSPADM

## 2024-12-13 RX ORDER — PROPOFOL 10 MG/ML
INJECTION, EMULSION INTRAVENOUS AS NEEDED
Status: DISCONTINUED | OUTPATIENT
Start: 2024-12-13 | End: 2024-12-13

## 2024-12-13 RX ORDER — SODIUM CHLORIDE, SODIUM LACTATE, POTASSIUM CHLORIDE, CALCIUM CHLORIDE 600; 310; 30; 20 MG/100ML; MG/100ML; MG/100ML; MG/100ML
125 INJECTION, SOLUTION INTRAVENOUS CONTINUOUS
Status: DISCONTINUED | OUTPATIENT
Start: 2024-12-13 | End: 2024-12-13 | Stop reason: HOSPADM

## 2024-12-13 RX ORDER — LIDOCAINE HYDROCHLORIDE 10 MG/ML
INJECTION, SOLUTION EPIDURAL; INFILTRATION; INTRACAUDAL; PERINEURAL AS NEEDED
Status: DISCONTINUED | OUTPATIENT
Start: 2024-12-13 | End: 2024-12-13

## 2024-12-13 RX ORDER — PROMETHAZINE HYDROCHLORIDE 25 MG/ML
12.5 INJECTION, SOLUTION INTRAMUSCULAR; INTRAVENOUS ONCE AS NEEDED
Status: DISCONTINUED | OUTPATIENT
Start: 2024-12-13 | End: 2024-12-13 | Stop reason: HOSPADM

## 2024-12-13 RX ORDER — MAGNESIUM HYDROXIDE 1200 MG/15ML
LIQUID ORAL AS NEEDED
Status: DISCONTINUED | OUTPATIENT
Start: 2024-12-13 | End: 2024-12-13 | Stop reason: HOSPADM

## 2024-12-13 RX ORDER — DOCUSATE SODIUM 100 MG/1
100 CAPSULE, LIQUID FILLED ORAL 3 TIMES DAILY PRN
Qty: 30 CAPSULE | Refills: 0 | Status: SHIPPED | OUTPATIENT
Start: 2024-12-13

## 2024-12-13 RX ORDER — ONDANSETRON 2 MG/ML
4 INJECTION INTRAMUSCULAR; INTRAVENOUS ONCE AS NEEDED
Status: DISCONTINUED | OUTPATIENT
Start: 2024-12-13 | End: 2024-12-13 | Stop reason: HOSPADM

## 2024-12-13 RX ORDER — HYDROMORPHONE HCL/PF 1 MG/ML
0.5 SYRINGE (ML) INJECTION
Status: DISCONTINUED | OUTPATIENT
Start: 2024-12-13 | End: 2024-12-13 | Stop reason: HOSPADM

## 2024-12-13 RX ORDER — ACETAMINOPHEN 325 MG/1
650 TABLET ORAL EVERY 6 HOURS PRN
Status: DISCONTINUED | OUTPATIENT
Start: 2024-12-13 | End: 2024-12-13 | Stop reason: HOSPADM

## 2024-12-13 RX ORDER — TRAMADOL HYDROCHLORIDE 50 MG/1
50 TABLET ORAL EVERY 6 HOURS PRN
Status: DISCONTINUED | OUTPATIENT
Start: 2024-12-13 | End: 2024-12-13 | Stop reason: HOSPADM

## 2024-12-13 RX ORDER — SCOLOPAMINE TRANSDERMAL SYSTEM 1 MG/1
1 PATCH, EXTENDED RELEASE TRANSDERMAL
Status: DISCONTINUED | OUTPATIENT
Start: 2024-12-13 | End: 2024-12-13 | Stop reason: HOSPADM

## 2024-12-13 RX ORDER — MEPERIDINE HYDROCHLORIDE 50 MG/ML
12.5 INJECTION INTRAMUSCULAR; INTRAVENOUS; SUBCUTANEOUS ONCE
Status: COMPLETED | OUTPATIENT
Start: 2024-12-13 | End: 2024-12-13

## 2024-12-13 RX ORDER — FENTANYL CITRATE/PF 50 MCG/ML
25 SYRINGE (ML) INJECTION
Status: COMPLETED | OUTPATIENT
Start: 2024-12-13 | End: 2024-12-13

## 2024-12-13 RX ORDER — CHLORHEXIDINE GLUCONATE 40 MG/ML
SOLUTION TOPICAL DAILY PRN
Status: DISCONTINUED | OUTPATIENT
Start: 2024-12-13 | End: 2024-12-13 | Stop reason: HOSPADM

## 2024-12-13 RX ORDER — ONDANSETRON 4 MG/1
4 TABLET, FILM COATED ORAL EVERY 8 HOURS PRN
Qty: 20 TABLET | Refills: 0 | Status: SHIPPED | OUTPATIENT
Start: 2024-12-13

## 2024-12-13 RX ORDER — MIDAZOLAM HYDROCHLORIDE 2 MG/2ML
INJECTION, SOLUTION INTRAMUSCULAR; INTRAVENOUS AS NEEDED
Status: DISCONTINUED | OUTPATIENT
Start: 2024-12-13 | End: 2024-12-13

## 2024-12-13 RX ORDER — CEFAZOLIN SODIUM 2 G/50ML
2000 SOLUTION INTRAVENOUS ONCE
Status: COMPLETED | OUTPATIENT
Start: 2024-12-13 | End: 2024-12-13

## 2024-12-13 RX ORDER — HEPARIN SODIUM 5000 [USP'U]/ML
5000 INJECTION, SOLUTION INTRAVENOUS; SUBCUTANEOUS ONCE
Status: COMPLETED | OUTPATIENT
Start: 2024-12-13 | End: 2024-12-13

## 2024-12-13 RX ADMIN — MIDAZOLAM HYDROCHLORIDE 2 MG: 1 INJECTION, SOLUTION INTRAMUSCULAR; INTRAVENOUS at 11:53

## 2024-12-13 RX ADMIN — SODIUM CHLORIDE, SODIUM LACTATE, POTASSIUM CHLORIDE, AND CALCIUM CHLORIDE: .6; .31; .03; .02 INJECTION, SOLUTION INTRAVENOUS at 11:53

## 2024-12-13 RX ADMIN — FENTANYL CITRATE 25 MCG: 50 INJECTION INTRAMUSCULAR; INTRAVENOUS at 13:55

## 2024-12-13 RX ADMIN — PROPOFOL 200 MG: 10 INJECTION, EMULSION INTRAVENOUS at 11:57

## 2024-12-13 RX ADMIN — FENTANYL CITRATE 25 MCG: 50 INJECTION INTRAMUSCULAR; INTRAVENOUS at 13:35

## 2024-12-13 RX ADMIN — FENTANYL CITRATE 25 MCG: 50 INJECTION INTRAMUSCULAR; INTRAVENOUS at 13:40

## 2024-12-13 RX ADMIN — HEPARIN SODIUM 5000 UNITS: 5000 INJECTION, SOLUTION INTRAVENOUS; SUBCUTANEOUS at 10:04

## 2024-12-13 RX ADMIN — ONDANSETRON 4 MG: 2 INJECTION INTRAMUSCULAR; INTRAVENOUS at 12:10

## 2024-12-13 RX ADMIN — DEXAMETHASONE SODIUM PHOSPHATE 5 MG: 10 INJECTION INTRAMUSCULAR; INTRAVENOUS at 12:10

## 2024-12-13 RX ADMIN — TRAMADOL HYDROCHLORIDE 50 MG: 50 TABLET, COATED ORAL at 14:18

## 2024-12-13 RX ADMIN — FENTANYL CITRATE 25 MCG: 50 INJECTION INTRAMUSCULAR; INTRAVENOUS at 13:50

## 2024-12-13 RX ADMIN — SUGAMMADEX 200 MG: 100 INJECTION, SOLUTION INTRAVENOUS at 13:00

## 2024-12-13 RX ADMIN — MEPERIDINE HYDROCHLORIDE 12.5 MG: 50 INJECTION INTRAMUSCULAR; INTRAVENOUS; SUBCUTANEOUS at 13:29

## 2024-12-13 RX ADMIN — LIDOCAINE HYDROCHLORIDE 100 MG: 10 INJECTION, SOLUTION EPIDURAL; INFILTRATION; INTRACAUDAL; PERINEURAL at 11:57

## 2024-12-13 RX ADMIN — ROCURONIUM BROMIDE 20 MG: 10 INJECTION, SOLUTION INTRAVENOUS at 12:25

## 2024-12-13 RX ADMIN — CEFAZOLIN SODIUM 2000 MG: 2 SOLUTION INTRAVENOUS at 12:02

## 2024-12-13 RX ADMIN — ROCURONIUM BROMIDE 50 MG: 10 INJECTION, SOLUTION INTRAVENOUS at 11:57

## 2024-12-13 NOTE — OP NOTE
OPERATIVE REPORT  PATIENT NAME: Frank Bustos    :  1980  MRN: [id number]  Pt Location: [facility] OR ROOM 03    SURGERY DATE: 2024    Surgeons and Role:     * Diaz Thomas, DO - Primary     * Santa Mckeon PA-C - Assisting    Preop Diagnosis:  Non-recurrent unilateral inguinal hernia without obstruction or gangrene [K40.90]    Post-Op Diagnosis Codes:     * Non-recurrent unilateral inguinal hernia without obstruction or gangrene [K40.90]    Procedure(s):  Right - REPAIR HERNIA INGUINAL. LAPAROSCOPIC    Specimen(s):  * No specimens in log *    Estimated Blood Loss:   Minimal    Drains:  * No LDAs found *    Anesthesia Type:   General    Operative Indications:  Non-recurrent unilateral inguinal hernia without obstruction or gangrene [K40.90]    Operative Findings:  Small right indirect inguinal hernia  No inguinal hernia seen on the left    Complications:   None    Procedure and Technique:  The patient was seen again in Preoperative Holding.  All the risks, benefits, complications, treatment options, and expected outcomes were discussed with the patient and family at length. All questions were answered to satisfaction. There was concurrence with the proposed plan and informed consent was obtained. The site of surgery was properly noted/marked. The patient was taken to Operating Room, identified, and the procedure verified as Right laparoscopic inguinal hernia repair with mesh, possible open.    The patient was placed in the supine position on the operating room table.  The patient had received preoperative antibiotics and SCDs placed on the bilateral lower extremities.  Anesthesia was then induced and the patient was intubated.    The abdomen was then prepped and draped in the usual sterile fashion using ChloraPrep.  A Time-Out was then performed with all involved present confirming the correct patient, procedure, antibiotics, and any additional concerns. A 1.5 centimeter supraumbilical incision was  made in a transverse fashion using a #15 blade and the umbilical stalk was grasped and elevated.  Using blunt dissection the fascia was exposed and was incised.  Using a large blunt Naye clamp the peritoneum was entered under direct visualization.  A 11 mm port was then placed in the fascial opening and pneumoperitoneum was established.  Initial pressure upon establishing pneumoperitoneum was noted to be low.  Two additional 5 mm ports were placed under direct visualization; one on the right side of the abdomen just above the umbilicus in the midclavicular line and one on the left side of the abdomen just above the umbilicus in the midclavicular line.  There was a small right indirect inguinal hernia, no inguinal hernia on the left. The peritoneum overlying the Right groin was incised and reflected. The hernia sac was identified and carefully dissected from the cord structures without injuring them. The preperitoneal space was carefully expanded using blunt dissection in preparation for mesh placement. A 15 cm by 10 cm ProGrip mesh was marked for orientation and introduced into the abdomen via the 11 mm port. The mesh was introduced into the preperitoneal space and and spread evenly making sure to cover the hernia defect. The peritoneum was closed with running suture of 0 absorbable V-Loc with the Endo Stitch device. Hemostasis was noted. The abdomen was desufflated and the supraumbilical fascial incision was closed with 0 Vicryl in an interrupted fashion using 5 stitches.  The abdomen was then reinsufflated and the fascial incision was inspected and noted to be hemostatic without any insufflation leakage.  The remaining 5 mm ports were removed and the abdomen was desufflated.  Local anesthesia infiltrated in the port sites using 0.25% Marcaine. The port sites were then closed using 4-0 Monocryl.  The abdomen was then cleansed and dried and Steri-Strips, 2 x 2, Tegaderm were used to cover the sites.    All  instrument, sponge, and needle counts were noted to be correct at the conclusion of the case.     I was present for the entire procedure., A qualified resident physician was not available., and A physician assistant was required during the procedure for retraction, tissue handling, dissection and suturing.    Patient Disposition:  PACU  and extubated and stable    SIGNATURE: Diaz Thomas,   DATE: December 13, 2024  TIME: 12:57 PM

## 2024-12-13 NOTE — ANESTHESIA POSTPROCEDURE EVALUATION
Post-Op Assessment Note    CV Status:  Stable  Pain Score: 0    Pain management: adequate       Mental Status:  Sleepy   Hydration Status:  Stable   PONV Controlled:  None   Airway Patency:  Patent  Two or more mitigation strategies used for obstructive sleep apnea   Post Op Vitals Reviewed: Yes    No anethesia notable event occurred.            Last Filed PACU Vitals:  Vitals Value Taken Time   Temp     Pulse     BP     Resp     SpO2         Modified Terrell:  No data recorded

## 2024-12-13 NOTE — DISCHARGE INSTR - AVS FIRST PAGE
Your incisions are covered with Steri-Strips, 4 x 4 gauze, Tegaderm.  In 2 days you may remove your dressing, the Steri-Strips will remain on your skin but the 4 x 4 gauze and Tegaderm can be removed.  The Steri-Strips will fall off on their own.  After your dressings are removed you may shower.  Do not soak your incisions including tub baths or swimming.  You may shower and let water and soap wash over incisions. Do not scrub your incisions.  Your scrotum may become swollen or bruised, this is normal and should resolve on its own.  No heavy lifting greater than 15 lb for 4 weeks or until cleared by your Surgeon.  You may resume your normal diet as tolerated.  Do not make any important decisions and do not drive while taking narcotic pain medication.  You are prescribed Tramadol for severe pain. Take only as needed.  Take Colace to soften your stool while taking narcotic pain medication.  You may take Tylenol over-the-counter as needed for pain, follow instructions on the bottle.  You may take Ibuprofen over-the-counter as needed for pain, follow instructions on the bottle.  You may alternate Tylenol and Ibuprofen if needed, but do not take at the same time.  Follow-up with your Surgeon in 2 weeks, call the office for an appointment.  You will receive a survey via Email in regards to your same day surgery experience. Please fill out the survey to let us know how we did with your care.

## 2024-12-13 NOTE — INTERVAL H&P NOTE
H&P reviewed. After examining the patient I find no changes in the patients condition since the H&P had been written.    Vitals:    12/13/24 1000   BP: 128/76   Pulse: 77   Resp: 16   Temp: 97.5 °F (36.4 °C)   SpO2: 99%

## 2024-12-13 NOTE — TELEPHONE ENCOUNTER
Received call from patients wife regarding psot op nausea.    Patient had inguinal hernia repair today with Dr Thomas and had some nausea on the way home. She is concerned because he has had nausea/vomiting in the past after surgery and she is wondering if something can be sent to his pharmacy for nausea.    Patient uses  [id number] - [place] - [address] [id number]  Phone: [phone]  Fax: [phone]       Please advise if something can be sent in  #[phone]

## 2024-12-13 NOTE — ANESTHESIA PREPROCEDURE EVALUATION
Procedure:  REPAIR HERNIA INGUINAL, LAPAROSCOPIC, Possible Open, (Right: Groin)    Relevant Problems   ANESTHESIA   (+) Motion sickness   (+) PONV (postoperative nausea and vomiting)      Other   (+) Non-recurrent unilateral inguinal hernia without obstruction or gangrene        Physical Exam    Airway    Mallampati score: II  TM Distance: >3 FB  Neck ROM: full     Dental   No notable dental hx     Cardiovascular      Pulmonary      Other Findings        Anesthesia Plan  ASA Score- 1     Anesthesia Type- general with ASA Monitors.         Additional Monitors:     Airway Plan: ETT.    Comment: Patient seen and examined.  History reviewed.  Patient to be done under general anesthesia with ETT and routine monitors.  Risks discussed with the patient. Consent obtained..       Plan Factors-Exercise tolerance (METS): >4 METS.    Chart reviewed.   Existing labs reviewed. Patient summary reviewed.    Patient is not a current smoker.  Patient did not smoke on day of surgery.    Obstructive sleep apnea risk education given perioperatively.        Induction- intravenous.    Postoperative Plan- Plan for postoperative opioid use. Planned trial extubation        Informed Consent- Anesthetic plan and risks discussed with patient.  I personally reviewed this patient with the CRNA. Discussed and agreed on the Anesthesia Plan with the CRNA..

## 2024-12-15 NOTE — ANESTHESIA POSTPROCEDURE EVALUATION
Post-Op Assessment Note    CV Status:  Stable    Pain management: adequate       Mental Status:  Alert and awake   Hydration Status:  Euvolemic   PONV Controlled:  Controlled   Airway Patency:  Patent     Post Op Vitals Reviewed: Yes    No anethesia notable event occurred.    Staff: Anesthesiologist           Last Filed PACU Vitals:  Vitals Value Taken Time   Temp 98.1 °F (36.7 °C) 12/13/24 1418   Pulse 63 12/13/24 1456   /64 12/13/24 1445   Resp 11 12/13/24 1456   SpO2 100 % 12/13/24 1456   Vitals shown include unfiled device data.    Modified Terrell:  No data recorded

## 2024-12-30 ENCOUNTER — OFFICE VISIT (OUTPATIENT)
Dept: SURGERY | Facility: CLINIC | Age: 44
End: 2024-12-30

## 2024-12-30 VITALS
BODY MASS INDEX: 24.1 KG/M2 | OXYGEN SATURATION: 99 % | TEMPERATURE: 97.9 F | SYSTOLIC BLOOD PRESSURE: 128 MMHG | HEIGHT: 68 IN | HEART RATE: 87 BPM | DIASTOLIC BLOOD PRESSURE: 84 MMHG | WEIGHT: 159 LBS

## 2024-12-30 DIAGNOSIS — K40.90 NON-RECURRENT UNILATERAL INGUINAL HERNIA WITHOUT OBSTRUCTION OR GANGRENE: Primary | ICD-10-CM

## 2024-12-30 PROCEDURE — 99024 POSTOP FOLLOW-UP VISIT: CPT | Performed by: STUDENT IN AN ORGANIZED HEALTH CARE EDUCATION/TRAINING PROGRAM

## 2024-12-30 NOTE — PROGRESS NOTES
Name: Frank Bustos      : 1980      MRN: [id number]  Encounter Provider: Diaz Thomas DO  Encounter Date: 2024   Encounter department: [facility]  :  Assessment & Plan  Non-recurrent unilateral inguinal hernia without obstruction or gangrene  44-year-old male status post laparoscopic right inguinal hernia repair with mesh on 2024  -Patient is tolerating a diet and having bowel function  -Denies any abdominal pain  -Notes with certain movements or stretching he does have a little bit of discomfort/pulling but this has been improving  -Laparoscopic incisions healing well without erythema induration or drainage, no signs of hernia recurrence  -Patient has returned to work without issue  -Recommend no heavy lifting greater than 15 to 20 pounds for total of 4 weeks after surgery  -Follow-up in office as needed             History of Present Illness   HPI  Frank Bustos is a 44 y.o. male who presents for evaluation status post laparoscopic right inguinal hernia repair with mesh.  He is tolerating a diet and having bowel function.  He denies any abdominal pain.  He notes he had some pain the first few days after surgery but this resolved in time.  He does feel little bit of pulling with certain stretching movements but this is also improving.  History obtained from: patient    Review of Systems   Constitutional:  Negative for chills, fatigue and fever.   HENT:  Negative for congestion, hearing loss, rhinorrhea and sore throat.    Eyes:  Negative for pain and discharge.   Respiratory:  Negative for cough, chest tightness and shortness of breath.    Cardiovascular:  Negative for chest pain and palpitations.   Gastrointestinal:  Negative for abdominal pain, constipation, diarrhea, nausea and vomiting.   Endocrine: Negative for cold intolerance and heat intolerance.   Genitourinary:  Negative for difficulty urinating and dysuria.   Musculoskeletal:  Negative for back  "pain and neck pain.   Skin:  Negative for color change and rash.   Allergic/Immunologic: Negative for environmental allergies and food allergies.   Neurological:  Negative for seizures and headaches.   Hematological:  Does not bruise/bleed easily.   Psychiatric/Behavioral:  Negative for confusion and hallucinations.      Medical History Reviewed by provider this encounter:  Tobacco  Allergies  Meds  Problems  Med Hx  Surg Hx  Fam Hx     .  Past Medical History   Past Medical History:   Diagnosis Date    Blood in the urine     approx 7 yrs ago with kidney stone-dark brownish urine, no further blood in urine noted    Dental cavities     appt for fillings at the dentist 12/9/24 and pt to call surg office today to notify    Exercises daily     Headache     \"few times a month\"    Kidney stone     approx 7 yrs ago    Motion sickness     \"carsickness\"    Penis pain     \"tip of \" but has lessened, increases rex when voiding,     PONV (postoperative nausea and vomiting)     Right inguinal hernia      Past Surgical History:   Procedure Laterality Date    HERNIA REPAIR Right 12/13/2024    Procedure: REPAIR HERNIA INGUINAL, LAPAROSCOPIC;  Surgeon: Diaz Thomas DO;  Location: [facility];  Service: General    [place] CYSTO/URETERO W/LITHOTRIPSY &INDWELL STENT INSRT Left 09/08/2017    Procedure: CYSTOSCOPY URETEROSCOPY with stone extraction/fragmentation;  Surgeon: Wong Jackson MD;  Location: [facility];  Service: Urology    VESICOSTOMY      \"varicocele-surgery--as a teenager\"     History reviewed. No pertinent family history.   reports that he has never smoked. He has been exposed to tobacco smoke. He has never used smokeless tobacco. He reports that he does not drink alcohol and does not use drugs.  Current Outpatient Medications on File Prior to Visit   Medication Sig Dispense Refill    [DISCONTINUED] docusate sodium (COLACE) 100 mg capsule Take 1 capsule (100 mg total) by mouth 3 (three) times a day as needed " "for constipation (while taking narcotic pain medication) 30 capsule 0    [DISCONTINUED] ondansetron (ZOFRAN) 4 mg tablet Take 1 tablet (4 mg total) by mouth every 8 (eight) hours as needed for nausea or vomiting 20 tablet 0     No current facility-administered medications on file prior to visit.   No Known Allergies   Current Outpatient Medications on File Prior to Visit   Medication Sig Dispense Refill    [DISCONTINUED] docusate sodium (COLACE) 100 mg capsule Take 1 capsule (100 mg total) by mouth 3 (three) times a day as needed for constipation (while taking narcotic pain medication) 30 capsule 0    [DISCONTINUED] ondansetron (ZOFRAN) 4 mg tablet Take 1 tablet (4 mg total) by mouth every 8 (eight) hours as needed for nausea or vomiting 20 tablet 0     No current facility-administered medications on file prior to visit.      Social History     Tobacco Use    Smoking status: Never     Passive exposure: Past    Smokeless tobacco: Never   Vaping Use    Vaping status: Never Used   Substance and Sexual Activity    Alcohol use: No    Drug use: No    Sexual activity: Not Currently     Comment: defer        Objective   /84 (Patient Position: Sitting, Cuff Size: Standard)   Pulse 87   Temp 97.9 °F (36.6 °C) (Temporal)   Ht 5' 8\" (1.727 m)   Wt 72.1 kg (159 lb)   SpO2 99%   BMI 24.18 kg/m²      Physical Exam  Constitutional:       Appearance: Normal appearance.   HENT:      Head: Normocephalic and atraumatic.      Nose: Nose normal.   Eyes:      General: No scleral icterus.     Conjunctiva/sclera: Conjunctivae normal.   Cardiovascular:      Rate and Rhythm: Normal rate.   Pulmonary:      Effort: Pulmonary effort is normal.   Abdominal:      General: There is no distension.      Palpations: Abdomen is soft.      Tenderness: There is no abdominal tenderness.      Comments: Laparoscopic incisions healing well without erythema induration or drainage, no signs of hernia recurrence   Musculoskeletal:         General: " No signs of injury.   Skin:     General: Skin is warm.      Coloration: Skin is not jaundiced.   Neurological:      General: No focal deficit present.      Mental Status: He is alert and oriented to person, place, and time.   Psychiatric:         Mood and Affect: Mood normal.         Behavior: Behavior normal.

## 2024-12-30 NOTE — ASSESSMENT & PLAN NOTE
44-year-old male status post laparoscopic right inguinal hernia repair with mesh on 12/13/2024  -Patient is tolerating a diet and having bowel function  -Denies any abdominal pain  -Notes with certain movements or stretching he does have a little bit of discomfort/pulling but this has been improving  -Laparoscopic incisions healing well without erythema induration or drainage, no signs of hernia recurrence  -Patient has returned to work without issue  -Recommend no heavy lifting greater than 15 to 20 pounds for total of 4 weeks after surgery  -Follow-up in office as needed

## 2025-02-12 ENCOUNTER — TELEPHONE (OUTPATIENT)
Age: 45
End: 2025-02-12

## 2025-02-12 NOTE — TELEPHONE ENCOUNTER
Patient return call from office. Warm transferred to [facility] for further assistance.

## 2025-02-13 ENCOUNTER — TELEPHONE (OUTPATIENT)
Dept: SURGERY | Facility: CLINIC | Age: 45
End: 2025-02-13

## 2025-02-13 ENCOUNTER — OFFICE VISIT (OUTPATIENT)
Dept: SURGERY | Facility: CLINIC | Age: 45
End: 2025-02-13

## 2025-02-13 VITALS
HEIGHT: 68 IN | BODY MASS INDEX: 24.25 KG/M2 | SYSTOLIC BLOOD PRESSURE: 120 MMHG | DIASTOLIC BLOOD PRESSURE: 72 MMHG | RESPIRATION RATE: 18 BRPM | OXYGEN SATURATION: 97 % | WEIGHT: 160 LBS | HEART RATE: 77 BPM | TEMPERATURE: 97.5 F

## 2025-02-13 DIAGNOSIS — R10.31 BILATERAL GROIN PAIN: Primary | ICD-10-CM

## 2025-02-13 DIAGNOSIS — K40.90 NON-RECURRENT UNILATERAL INGUINAL HERNIA WITHOUT OBSTRUCTION OR GANGRENE: ICD-10-CM

## 2025-02-13 DIAGNOSIS — R10.32 BILATERAL GROIN PAIN: Primary | ICD-10-CM

## 2025-02-13 PROCEDURE — 99024 POSTOP FOLLOW-UP VISIT: CPT | Performed by: STUDENT IN AN ORGANIZED HEALTH CARE EDUCATION/TRAINING PROGRAM

## 2025-02-13 NOTE — PROGRESS NOTES
Name: Frank Bustos      : 1980      MRN: [id number]  Encounter Provider: Diaz Thomas DO  Encounter Date: 2025   Encounter department: [facility]  :  Assessment & Plan  Bilateral groin pain  44-year-old male status post laparoscopic right inguinal hernia repair with mesh on 2024, presenting with bilateral groin and abdominal pain  -Please see HPI  -Laparoscopic incisions well-healed without erythema induration or drainage, no signs of hernia in the bilateral inguinal regions  -Discussed with patient that this may be related to a muscle or groin strain since it includes the bilateral groin and his abs  -His exam is reassuring with no hernia noted in the bilateral groin  -At this time I recommend some rest and hopefully this will resolve on its own  -If it does resolve on its own slowly increase physical activity as tolerated  -If this discomfort does not resolve in 1 to 2 months recommend calling the office for reevaluation and possible imaging  -Patient voiced understanding and is in agreement           Non-recurrent unilateral inguinal hernia without obstruction or gangrene  44-year-old male status post laparoscopic right inguinal hernia repair with mesh on 2024  -No recurrent hernia palpated on exam             History of Present Illness   HPI  Frank Bustos is a 44 y.o. male who presents for evaluation of bilateral groin and abdominal pain.  He states after 4 weeks of recovery he was feeling fine.  He started getting back to his normal activities but did develop some bilateral groin/abdominal pain.  He initially noted intermittent right groin twinges/discomfort but this resolved on its own and did not persist.  He did go snowboarding twice without any issue.  He was at his son's lacrosse practice and jumped off bleachers, but he did not feel any discomfort or pain afterwards.  He went snowboarding again and did notice some intermittent pain in the  "bilateral groin and the abs.  It initially was sharp but then became just dull and sore.  This has persisted.  He notes he was feeling better on Monday and went back to [facility] and the pain did not significantly increase but overall is still there.  He has not been taking any pain medications.  Notes the pain is around a 1 or 2 on a 10 level scale.  He is tolerating a diet and having bowel function.    History obtained from: patient    Review of Systems   Constitutional:  Negative for chills, fatigue and fever.   HENT:  Negative for congestion, hearing loss, rhinorrhea and sore throat.    Eyes:  Negative for pain and discharge.   Respiratory:  Negative for cough, chest tightness and shortness of breath.    Cardiovascular:  Negative for chest pain and palpitations.   Gastrointestinal:  Positive for abdominal pain. Negative for constipation, diarrhea, nausea and vomiting.        Positive bilateral groin pain   Endocrine: Negative for cold intolerance and heat intolerance.   Genitourinary:  Negative for difficulty urinating and dysuria.   Musculoskeletal:  Negative for back pain and neck pain.   Skin:  Negative for color change and rash.   Allergic/Immunologic: Negative for environmental allergies and food allergies.   Neurological:  Negative for seizures and headaches.   Hematological:  Does not bruise/bleed easily.   Psychiatric/Behavioral:  Negative for confusion and hallucinations.      Medical History Reviewed by provider this encounter:  Tobacco  Allergies  Meds  Problems  Med Hx  Surg Hx  Fam Hx     .  Past Medical History   Past Medical History:   Diagnosis Date    Blood in the urine     approx 7 yrs ago with kidney stone-dark brownish urine, no further blood in urine noted    Dental cavities     appt for fillings at the dentist 12/9/24 and pt to call surg office today to notify    Exercises daily     Headache     \"few times a month\"    Kidney stone     approx 7 yrs ago    Motion sickness     " "\"carsickness\"    Penis pain     \"tip of \" but has lessened, increases rex when voiding,     PONV (postoperative nausea and vomiting)     Right inguinal hernia      Past Surgical History:   Procedure Laterality Date    HERNIA REPAIR Right 12/13/2024    Procedure: REPAIR HERNIA INGUINAL, LAPAROSCOPIC;  Surgeon: Diaz Thomas DO;  Location: [facility] OR;  Service: General    [place] CYSTO/URETERO W/LITHOTRIPSY &INDWELL STENT INSRT Left 09/08/2017    Procedure: CYSTOSCOPY URETEROSCOPY with stone extraction/fragmentation;  Surgeon: Wong Jackson MD;  Location: [facility] OR;  Service: Urology    VESICOSTOMY      \"varicocele-surgery--as a teenager\"     History reviewed. No pertinent family history.   reports that he has never smoked. He has been exposed to tobacco smoke. He has never used smokeless tobacco. He reports that he does not drink alcohol and does not use drugs.  No current outpatient medications on file prior to visit.     No current facility-administered medications on file prior to visit.   No Known Allergies   No current outpatient medications on file prior to visit.     No current facility-administered medications on file prior to visit.      Social History     Tobacco Use    Smoking status: Never     Passive exposure: Past    Smokeless tobacco: Never   Vaping Use    Vaping status: Never Used   Substance and Sexual Activity    Alcohol use: No    Drug use: No    Sexual activity: Not Currently     Comment: defer        Objective   /72 (BP Location: Left arm, Patient Position: Sitting, Cuff Size: Standard)   Pulse 77   Temp 97.5 °F (36.4 °C)   Resp 18   Ht 5' 8\" (1.727 m)   Wt 72.6 kg (160 lb)   SpO2 97%   BMI 24.33 kg/m²      Physical Exam  Constitutional:       Appearance: Normal appearance.   HENT:      Head: Normocephalic and atraumatic.      Nose: Nose normal.   Eyes:      General: No scleral icterus.     Conjunctiva/sclera: Conjunctivae normal.   Cardiovascular:      Rate and Rhythm: " Normal rate.   Pulmonary:      Effort: Pulmonary effort is normal.   Abdominal:      General: There is no distension.      Palpations: Abdomen is soft.      Tenderness: There is no abdominal tenderness.      Comments: Laparoscopic incisions well-healed without erythema induration or drainage, no inguinal hernia palpated in the bilateral groin   Musculoskeletal:         General: No signs of injury.   Skin:     General: Skin is warm.      Coloration: Skin is not jaundiced.   Neurological:      General: No focal deficit present.      Mental Status: He is alert and oriented to person, place, and time.   Psychiatric:         Mood and Affect: Mood normal.         Behavior: Behavior normal.

## 2025-02-13 NOTE — ASSESSMENT & PLAN NOTE
44-year-old male status post laparoscopic right inguinal hernia repair with mesh on 12/13/2024, presenting with bilateral groin and abdominal pain  -Please see HPI  -Laparoscopic incisions well-healed without erythema induration or drainage, no signs of hernia in the bilateral inguinal regions  -Discussed with patient that this may be related to a muscle or groin strain since it includes the bilateral groin and his abs  -His exam is reassuring with no hernia noted in the bilateral groin  -At this time I recommend some rest and hopefully this will resolve on its own  -If it does resolve on its own slowly increase physical activity as tolerated  -If this discomfort does not resolve in 1 to 2 months recommend calling the office for reevaluation and possible imaging  -Patient voiced understanding and is in agreement

## 2025-02-13 NOTE — TELEPHONE ENCOUNTER
LMOM for pt to come in at 10 am since we had a cancellation. To call me back if this time does not work

## 2025-02-13 NOTE — ASSESSMENT & PLAN NOTE
44-year-old male status post laparoscopic right inguinal hernia repair with mesh on 12/13/2024  -No recurrent hernia palpated on exam

## 2025-03-11 ENCOUNTER — PATIENT MESSAGE (OUTPATIENT)
Dept: SURGERY | Facility: CLINIC | Age: 45
End: 2025-03-11

## 2025-03-11 DIAGNOSIS — R10.31 BILATERAL GROIN PAIN: Primary | ICD-10-CM

## 2025-03-11 DIAGNOSIS — R10.32 BILATERAL GROIN PAIN: Primary | ICD-10-CM

## 2025-03-19 ENCOUNTER — HOSPITAL ENCOUNTER (OUTPATIENT)
Dept: CT IMAGING | Facility: CLINIC | Age: 45
Discharge: HOME | End: 2025-03-19
Payer: COMMERCIAL

## 2025-03-19 DIAGNOSIS — R10.32 BILATERAL GROIN PAIN: ICD-10-CM

## 2025-03-19 DIAGNOSIS — R10.31 BILATERAL GROIN PAIN: ICD-10-CM

## 2025-03-19 PROCEDURE — 74177 CT ABD & PELVIS W/CONTRAST: CPT

## 2025-03-19 RX ADMIN — IOHEXOL 50 ML: 350 INJECTION, SOLUTION INTRAVENOUS at 11:44

## 2025-03-25 ENCOUNTER — TELEPHONE (OUTPATIENT)
Age: 45
End: 2025-03-25

## 2025-03-25 DIAGNOSIS — R10.32 BILATERAL GROIN PAIN: Primary | ICD-10-CM

## 2025-03-25 DIAGNOSIS — R10.9 ABDOMINAL WALL PAIN: ICD-10-CM

## 2025-03-25 DIAGNOSIS — R10.31 BILATERAL GROIN PAIN: Primary | ICD-10-CM

## 2025-03-25 NOTE — QUICK NOTE
Called patient at [phone] to discuss CT scan results.  Patient states he did have an upper respiratory infection at the time of CT scan, could be the reason for the lung CT scan results.  Plan to send results to his Primary care physician.    Patient continues to have bilateral groin, bilateral abdominal wall, and right flank pain.  Overall remains the same since last visit.  CT scan shows right inguinal hernia repair intact with mesh in place.  Discussed with patient that since his symptoms seem to be bilateral in both the groin and the abdomen along with the right flank greater than left it may be more musculoskeletal in origin.  Can consider discomfort from the hernia repair but I would expect this to only be in the right groin.  Patient does have a very small kidney stone on the left, does not appear to be causing any symptoms.  Referral placed to Orthopedic sports medicine, Dr. Mena, for evaluation.  If patient continues to have right groin pain can consider pain management for evaluation of nerve block, but does not seem like right inguinal nerve pain at this time.  Patient voiced understanding and all questions were answered to satisfaction.

## 2025-03-25 NOTE — TELEPHONE ENCOUNTER
Radiologist called to inform Dr. Thomas's office that the PT had completed his CT abdomen pelvis scan

## 2025-03-26 ENCOUNTER — RESULTS FOLLOW-UP (OUTPATIENT)
Dept: FAMILY MEDICINE CLINIC | Facility: CLINIC | Age: 45
End: 2025-03-26

## 2025-03-26 DIAGNOSIS — R93.89 ABNORMAL CHEST CT: Primary | ICD-10-CM

## 2025-03-26 DIAGNOSIS — I31.39 PERICARDIAL EFFUSION: ICD-10-CM

## 2025-04-01 ENCOUNTER — APPOINTMENT (OUTPATIENT)
Dept: RADIOLOGY | Facility: CLINIC | Age: 45
End: 2025-04-01
Payer: COMMERCIAL

## 2025-04-01 ENCOUNTER — OFFICE VISIT (OUTPATIENT)
Dept: OBGYN CLINIC | Facility: CLINIC | Age: 45
End: 2025-04-01
Payer: COMMERCIAL

## 2025-04-01 VITALS — WEIGHT: 159.6 LBS | HEIGHT: 68 IN | BODY MASS INDEX: 24.19 KG/M2

## 2025-04-01 DIAGNOSIS — R10.31 BILATERAL GROIN PAIN: ICD-10-CM

## 2025-04-01 DIAGNOSIS — R10.32 BILATERAL GROIN PAIN: ICD-10-CM

## 2025-04-01 DIAGNOSIS — M25.852 FEMOROACETABULAR IMPINGEMENT OF BOTH HIPS: ICD-10-CM

## 2025-04-01 DIAGNOSIS — R10.9 ABDOMINAL WALL PAIN: ICD-10-CM

## 2025-04-01 DIAGNOSIS — S39.81XA SPORTS HERNIA, INITIAL ENCOUNTER: Primary | ICD-10-CM

## 2025-04-01 DIAGNOSIS — M25.851 FEMOROACETABULAR IMPINGEMENT OF BOTH HIPS: ICD-10-CM

## 2025-04-01 PROCEDURE — 72170 X-RAY EXAM OF PELVIS: CPT

## 2025-04-01 PROCEDURE — 99244 OFF/OP CNSLTJ NEW/EST MOD 40: CPT | Performed by: FAMILY MEDICINE

## 2025-04-01 NOTE — PROGRESS NOTES
Name: Frank Bustos      : 1980      MRN: [id number]  Encounter Provider: Len Mena DO  Encounter Date: 2025   Encounter department: [facility] [place]  :  Assessment & Plan  Sports hernia, initial encounter  44-year-old male with bilateral groin pain.  X-rays pelvis noted for mild osseous prominence at the right hip femoral head and neck junction, without significant degenerative changes of the hips.  Clinical impression is that he may have symptoms for combination of sports hernia and femoral acetabular impingement.  Discussed treatment regimen of supplements and formal therapy.  Start taking turmeric, tart cherry, and glucosamine supplements  Start formal therapy as soon as possible and do home exercises I directed.  Follow-up after completing at least 4 weeks of formal therapy.  Orders:    Ambulatory Referral to Orthopedic Surgery    XR pelvis ap only 1 or 2 vw; Future    Ambulatory Referral to Physical Therapy; Future    Femoroacetabular impingement of both hips  44-year-old male with bilateral groin pain.  X-rays pelvis noted for mild osseous prominence at the right hip femoral head and neck junction, without significant degenerative changes of the hips.  Clinical impression is that he may have symptoms for combination of sports hernia and femoral acetabular impingement.  Discussed treatment regimen of supplements and formal therapy.  Start taking turmeric, tart cherry, and glucosamine supplements  Start formal therapy as soon as possible and do home exercises I directed.  Follow-up after completing at least 4 weeks of formal therapy.  Orders:    Ambulatory Referral to Orthopedic Surgery    XR pelvis ap only 1 or 2 vw; Future    Ambulatory Referral to Physical Therapy; Future        History of Present Illness   Chief Complaint   Patient presents with    Right Hip - Pain    Left Hip - Pain      HPI  Frank Bustos is a 44 y.o. male who  "presents for evaluation of bilateral groin pain more than 2 months duration.  He reports onset of symptoms after month events including jumping down from bleachers height of 4 feet and after snowboarding.  Pain described as gradual in onset, localized to the anterior/groin aspect both hips, achy and throbbing, radiating to the abdominals on both sides, worse with prolonged standing and ambulation, and improved with resting.  He states that when he first gets up in the morning he has little to no pain, but pain worsened through the day and can intensify to 4-5 out of 10.  He does not usually take medication for symptoms.  He is status post laparoscopic right-sided inguinal hernia repair with Dr. Thomas on 12/13/2024.  He had follow-up with Dr. Thomas and had CT scan done which ruled out recurrence of hernia and he was referred to sports medicine.    History obtained from: patient    Review of Systems   Musculoskeletal:  Positive for arthralgias. Negative for back pain and joint swelling.   Neurological:  Negative for numbness.          Objective   Ht 5' 8\" (1.727 m)   Wt 72.4 kg (159 lb 9.6 oz)   BMI 24.27 kg/m²      Physical Exam  Vitals and nursing note reviewed.   Constitutional:       Appearance: Normal appearance. He is well-developed. He is not ill-appearing or diaphoretic.   HENT:      Head: Normocephalic and atraumatic.      Right Ear: External ear normal.      Left Ear: External ear normal.   Eyes:      General:         Right eye: No discharge.         Left eye: No discharge.   Pulmonary:      Effort: Pulmonary effort is normal. No respiratory distress.   Abdominal:      General: There is no distension.   Musculoskeletal:         General: No swelling or tenderness.      Lumbar back: Negative right straight leg raise test and negative left straight leg raise test.   Skin:     General: Skin is warm and dry.      Coloration: Skin is not jaundiced or pale.   Neurological:      Mental Status: He is alert and " oriented to person, place, and time.   Psychiatric:         Mood and Affect: Mood normal.         Behavior: Behavior normal.         Thought Content: Thought content normal.         Judgment: Judgment normal.       Right Ankle Exam     Muscle Strength   Dorsiflexion:  5/5  Plantar flexion:  5/5       Left Ankle Exam     Muscle Strength   Dorsiflexion:  5/5   Plantar flexion:  5/5       Right Hip Exam     Range of Motion   External rotation:  30   Internal rotation:  10     Muscle Strength   Flexion: 5/5     Tests   ALIA: negative    Comments:  Negative FADDIR      Left Hip Exam     Range of Motion   External rotation:  30   Internal rotation: 10     Muscle Strength   Flexion: 5/5     Tests   ALIA: negative    Comments:  Negative FADDIR      Back Exam     Tenderness   The patient is experiencing no tenderness.     Range of Motion   The patient has normal back ROM.    Muscle Strength   Right Quadriceps:  5/5   Left Quadriceps:  5/5     Tests   Straight leg raise right: negative  Straight leg raise left: negative    Other   Sensation: normal  Gait: normal            I have personally reviewed pertinent films in PACS and my interpretation is  .   X-rays pelvis noted for mild osseous prominence at the right hip femoral head and neck junction, without significant degenerative changes of the hips.    Administrative Statements   I have spent a total time of 40 minutes in caring for this patient on the day of the visit/encounter including Diagnostic results, Prognosis, Risks and benefits of tx options, Instructions for management, Patient and family education, Impressions, Documenting in the medical record, Reviewing/placing orders in the medical record (including tests, medications, and/or procedures), and Obtaining or reviewing history  .

## 2025-04-01 NOTE — LETTER
2025     Rigoberto Lane PA-C  [address]  [facility] [address]    Patient: Frank Bustos   YOB: 1980   Date of Visit: 2025       Dear Dr. Lane:    Thank you for referring Frank Bustos to me for evaluation. Below are my notes for this consultation.    If you have questions, please do not hesitate to call me. I look forward to following your patient along with you.         Sincerely,        Len Mena,         CC: Diaz Thomas, DO Len Mena DO  2025 12:44 PM  Sign when Signing Visit  Name: Frank Bustos      : 1980      MRN: [id number]  Encounter Provider: Len Mena DO  Encounter Date: 2025   Encounter department: [facility] [place]  :  Assessment & Plan  Sports hernia, initial encounter  44-year-old male with bilateral groin pain.  X-rays pelvis noted for mild osseous prominence at the right hip femoral head and neck junction, without significant degenerative changes of the hips.  Clinical impression is that he may have symptoms for combination of sports hernia and femoral acetabular impingement.  Discussed treatment regimen of supplements and formal therapy.  Start taking turmeric, tart cherry, and glucosamine supplements  Start formal therapy as soon as possible and do home exercises I directed.  Follow-up after completing at least 4 weeks of formal therapy.  Orders:  •  Ambulatory Referral to Orthopedic Surgery  •  XR pelvis ap only 1 or 2 vw; Future  •  Ambulatory Referral to Physical Therapy; Future    Femoroacetabular impingement of both hips  44-year-old male with bilateral groin pain.  X-rays pelvis noted for mild osseous prominence at the right hip femoral head and neck junction, without significant degenerative changes of the hips.  Clinical impression is that he may have symptoms for combination of sports hernia and femoral acetabular  "impingement.  Discussed treatment regimen of supplements and formal therapy.  Start taking turmeric, tart cherry, and glucosamine supplements  Start formal therapy as soon as possible and do home exercises I directed.  Follow-up after completing at least 4 weeks of formal therapy.  Orders:  •  Ambulatory Referral to Orthopedic Surgery  •  XR pelvis ap only 1 or 2 vw; Future  •  Ambulatory Referral to Physical Therapy; Future        History of Present Illness  Chief Complaint   Patient presents with   • Right Hip - Pain   • Left Hip - Pain      HPI  Frank Bustos is a 44 y.o. male who presents for evaluation of bilateral groin pain more than 2 months duration.  He reports onset of symptoms after month events including jumping down from bleachers height of 4 feet and after snowboarding.  Pain described as gradual in onset, localized to the anterior/groin aspect both hips, achy and throbbing, radiating to the abdominals on both sides, worse with prolonged standing and ambulation, and improved with resting.  He states that when he first gets up in the morning he has little to no pain, but pain worsened through the day and can intensify to 4-5 out of 10.  He does not usually take medication for symptoms.  He is status post laparoscopic right-sided inguinal hernia repair with Dr. Thomas on 12/13/2024.  He had follow-up with Dr. Thomas and had CT scan done which ruled out recurrence of hernia and he was referred to sports medicine.    History obtained from: patient    Review of Systems   Musculoskeletal:  Positive for arthralgias. Negative for back pain and joint swelling.   Neurological:  Negative for numbness.          Objective  Ht 5' 8\" (1.727 m)   Wt 72.4 kg (159 lb 9.6 oz)   BMI 24.27 kg/m²      Physical Exam  Vitals and nursing note reviewed.   Constitutional:       Appearance: Normal appearance. He is well-developed. He is not ill-appearing or diaphoretic.   HENT:      Head: Normocephalic and atraumatic.      " Right Ear: External ear normal.      Left Ear: External ear normal.   Eyes:      General:         Right eye: No discharge.         Left eye: No discharge.   Pulmonary:      Effort: Pulmonary effort is normal. No respiratory distress.   Abdominal:      General: There is no distension.   Musculoskeletal:         General: No swelling or tenderness.      Lumbar back: Negative right straight leg raise test and negative left straight leg raise test.   Skin:     General: Skin is warm and dry.      Coloration: Skin is not jaundiced or pale.   Neurological:      Mental Status: He is alert and oriented to person, place, and time.   Psychiatric:         Mood and Affect: Mood normal.         Behavior: Behavior normal.         Thought Content: Thought content normal.         Judgment: Judgment normal.       Right Ankle Exam     Muscle Strength   Dorsiflexion:  5/5  Plantar flexion:  5/5       Left Ankle Exam     Muscle Strength   Dorsiflexion:  5/5   Plantar flexion:  5/5       Right Hip Exam     Range of Motion   External rotation:  30   Internal rotation:  10     Muscle Strength   Flexion: 5/5     Tests   ALIA: negative    Comments:  Negative FADDIR      Left Hip Exam     Range of Motion   External rotation:  30   Internal rotation: 10     Muscle Strength   Flexion: 5/5     Tests   ALIA: negative    Comments:  Negative FADDIR      Back Exam     Tenderness   The patient is experiencing no tenderness.     Range of Motion   The patient has normal back ROM.    Muscle Strength   Right Quadriceps:  5/5   Left Quadriceps:  5/5     Tests   Straight leg raise right: negative  Straight leg raise left: negative    Other   Sensation: normal  Gait: normal            I have personally reviewed pertinent films in PACS and my interpretation is  .   X-rays pelvis noted for mild osseous prominence at the right hip femoral head and neck junction, without significant degenerative changes of the hips.    Administrative Statements  I have spent a  total time of 40 minutes in caring for this patient on the day of the visit/encounter including Diagnostic results, Prognosis, Risks and benefits of tx options, Instructions for management, Patient and family education, Impressions, Documenting in the medical record, Reviewing/placing orders in the medical record (including tests, medications, and/or procedures), and Obtaining or reviewing history  .

## 2025-04-07 NOTE — PROGRESS NOTES
PT Evaluation     Today's date: 25  Patient name: Frank Bustos  : 1980  MRN: [id number]  Referring provider: Len Mena*  Dx:   Encounter Diagnosis     ICD-10-CM    1. Femoroacetabular impingement of both hips  M25.851 Ambulatory Referral to Physical Therapy    M25.852       2. Sports hernia, initial encounter  S39.81XA Ambulatory Referral to Physical Therapy                      Assessment  Impairments: abnormal gait, abnormal or restricted ROM, abnormal movement, activity intolerance, impaired physical strength, lacks appropriate home exercise program and pain with function  Functional limitations: Pain increased throughout day and with prolonged sitting and transfers sit to stand.  Symptom irritability: low    Assessment details: Frank Bustos is a 44 y.o. male referred with primary diagnosis of Femoroacetabular impingement of both hips  Sports hernia, initial encounter .  Patient presents with the following functional limitations: Pain increased throughout day and with prolonged sitting and transfers sit to stand.  He has also avoided strenuous exercise, weight training, and riding his bike.  No palpable tenderness at abdominals, hip flexors, or hip Adductors.  Therapist was unable to recreate pain today.  Patient demonstrates normal, pain-free ROM of the hip and spine.  Treatment to include: Manual therapy techniques, extremity/core strengthening, neuromuscular control exercises, instruction in a comprehensive HEP, and modalities as needed.  They will benefit from skilled PT services to address the above functional deficits and to decrease pain to promote a return to their premorbid level of function.    Understanding of Dx/Px/POC: good     Prognosis: good    Goals  STG (4 weeks)  1. Patient will demonstrate the ability to correct posture with minimal VC's  2. Patient will demonstrate 25% gains in Hip ROM in all deficient planes  3. Patient will report pain as a 4-5/10 at worst  with all normal activities  4. Patient will report 50% reduction in sleep disturbances related to pain  LTG (8 weeks)  1. Patient will report pain as a 0-1/10 at worst with normal activities  2. Patient will report the ability to sit and work on the computer without increased pain  3. Patient will report the ability to resume weight training without increased pain  4. Patient will demonstrate Hip strength WNL to improve improve transfers, quality of gait, and ability to negotiate stairs.  5. Patient will be independent and compliant with a HEP in order to maintain gains made with skilled PT services.      Plan  Patient would benefit from: skilled physical therapy  Referral necessary: No  Planned modality interventions: cryotherapy and thermotherapy: hydrocollator packs    Planned therapy interventions: abdominal trunk stabilization and activity modification    Frequency: 1-2x week  Duration in weeks: 8  Plan of Care beginning date: 4/8/2025  Plan of Care expiration date: 6/3/2025  Treatment plan discussed with: patient    Subjective Evaluation    History of Present Illness  Mechanism of injury: Frank Bustos is a 44 y.o. male who presents for evaluation of bilateral groin pain more than 2 months duration.  He reports onset of symptoms after resuming snowboarding on 1/27/25.  He states he was skating to push his board and that night he began having bilateral groin and abdominal strain.  Had a CT scan to rule out failure of Inguinal hernia repair.   After cleared by surgeon, he was referred to Sports Medicine.  Prescribed Glucosamine and Chondroitin, as well as, Tart Cherry and Turmeric.  Symptoms have been getting better.  He has started doing shorter hikes again.  Symptoms do get slightly worse with activity, but better than previously.  Referred now to outpatient PT services.  Patient Goals  Patient goals for therapy: decreased pain  Patient goal: Resume normal activities.  Ride his bike and resume weight  training.  Pain  Current pain ratin  At best pain ratin  At worst pain ratin  Location: Central pelvis above pubis and alternates to left or right in groin.  Bilateral abdomen (Right > Left)  Quality: dull ache  Exacerbated by: Kicking the soccer ball.  Sitting for a prolonged period and transferring sit to stand.    Social Support  Lives with: young children (11, 8, and 5 y.o.)    Employment status: working (.  Has standing desk and moves around throughout the day.)    Diagnostic Tests    FCE comments: Denies weakness.  Has not tried doing any strength training recently.  (-) sleep disturbances.  Has avoided doing heavy lifting and strenuous yard work.Treatments  Current treatment: medication    Objective     Concurrent Complaints  Negative for night pain, disturbed sleep, bladder dysfunction, bowel dysfunction, saddle (S4) numbness and history of trauma    Postural Observations  Seated posture: good  Standing posture: good      Palpation   Left   No palpable tenderness to the adductor brevis, adductor longus, iliopsoas and rectus abdominus.     Right   No palpable tenderness to the adductor brevis, adductor longus, iliopsoas and rectus abdominus.     Additional Palpation Details  No TTP bilateral obliques    Neurological Testing     Sensation     Lumbar   Left   Intact: light touch    Right   Intact: light touch    Active Range of Motion     Lumbar   Normal active range of motion  Left Hip   Normal active range of motion    Right Hip   Normal active range of motion  Mechanical Assessment    Cervical      Thoracic      Lumbar    Standing flexion: repeated movements   Pain location:no change  Standing extension: repeated movements  Pain location: no change    Tests   Cervical   Negative vertical compression.     Lumbar   Negative prone instability , vertical compression, SIJ compression and sacroiliac distraction.     Left   Negative femoral stretch, passive SLR and slump test.     Right    Negative femoral stretch, passive SLR and slump test.     Left Hip   Negative ALIA and FADIR.     Right Hip   Negative ALIA and FADIR.     Ambulation     Observational Gait   Gait: within functional limits   Walking speed and stride length within functional limits.        Precautions: Right inguinal hernia repair 12/24    POC expires Unit limit Auth  expiration date PT/OT + Visit Limit?   6/3/25 NA 12/31/25 BOMN                 Visit/Unit Tracking  AUTH Status:  Date 4/8              Authorized Used 1               Remaining                  QR     Manuals 4/8            Left L2 UPA mobs JF grade III                                                   Neuro Re-Ed             Pt education Injury and recovery.  Graded exercise resumption            Ahmeek Rows M,L             Ahmeek Paloff Press             Ahmeek Chops             Heavenly Reverse Chops             Crunches Instructed            Oblique Crunches Instructed            T-ball bridge             Side planks on knees             Front planks on knees                          Ther Ex                                                                                                                     Ther Activity             Bosu squats             Ball toss at rebounder on Bosu (flat up)             Monster walks             T-band sidestepping             Gait Training                                       Modalities                                             1 or 2

## 2025-04-08 ENCOUNTER — EVALUATION (OUTPATIENT)
Dept: PHYSICAL THERAPY | Facility: CLINIC | Age: 45
End: 2025-04-08
Payer: COMMERCIAL

## 2025-04-08 DIAGNOSIS — S39.81XA SPORTS HERNIA, INITIAL ENCOUNTER: ICD-10-CM

## 2025-04-08 DIAGNOSIS — M25.852 FEMOROACETABULAR IMPINGEMENT OF BOTH HIPS: ICD-10-CM

## 2025-04-08 DIAGNOSIS — M25.851 FEMOROACETABULAR IMPINGEMENT OF BOTH HIPS: ICD-10-CM

## 2025-04-08 PROCEDURE — 97112 NEUROMUSCULAR REEDUCATION: CPT | Performed by: PHYSICAL THERAPIST

## 2025-04-08 PROCEDURE — 97161 PT EVAL LOW COMPLEX 20 MIN: CPT | Performed by: PHYSICAL THERAPIST

## 2025-04-08 PROCEDURE — 97140 MANUAL THERAPY 1/> REGIONS: CPT | Performed by: PHYSICAL THERAPIST

## 2025-04-15 ENCOUNTER — OFFICE VISIT (OUTPATIENT)
Dept: PHYSICAL THERAPY | Facility: CLINIC | Age: 45
End: 2025-04-15
Payer: COMMERCIAL

## 2025-04-15 DIAGNOSIS — M25.852 FEMOROACETABULAR IMPINGEMENT OF BOTH HIPS: Primary | ICD-10-CM

## 2025-04-15 DIAGNOSIS — M25.851 FEMOROACETABULAR IMPINGEMENT OF BOTH HIPS: Primary | ICD-10-CM

## 2025-04-15 DIAGNOSIS — S39.81XA SPORTS HERNIA, INITIAL ENCOUNTER: ICD-10-CM

## 2025-04-15 PROCEDURE — 97112 NEUROMUSCULAR REEDUCATION: CPT

## 2025-04-15 PROCEDURE — 97110 THERAPEUTIC EXERCISES: CPT

## 2025-04-15 NOTE — PROGRESS NOTES
"Daily Note     Today's date: 4/15/2025  Patient name: Frank Bustos  : 1980  MRN: [id number]  Referring provider: Len Mena*  Dx:   Encounter Diagnosis     ICD-10-CM    1. Femoroacetabular impingement of both hips  M25.851     M25.852       2. Sports hernia, initial encounter  S39.81XA           Start Time: 925  Stop Time: 1013  Total time in clinic (min): 48 minutes    Subjective: Pt noted here and there he does have some changes in his pain status. Minimal pain noted upon arrival for treatment session this morning.       Objective: See treatment diary below      Assessment: Progression within POC. Tolerated treatment well. Noted a little muscle tension in abdominals with heavenly rever chops  to L this visit. No increase p! Noted.  Patient exhibited good technique with therapeutic exercises and would benefit from continued PT  S/p treatment session, reported some soreness.   DOMS may be noted 24 to 48 hours of muscle soreness.       Plan: Continue per plan of care.      Precautions: Right inguinal hernia repair     POC expires Unit limit Auth  expiration date PT/OT + Visit Limit?   6/3/25 NA 25 BOMN                 Visit/Unit Tracking  AUTH Status:  Date 4/8 4/15             Authorized Used 1 2              Remaining                  QR     Manuals 4/8 4/15           Left L2 UPA mobs JF grade III Not needed.                                                   Neuro Re-Ed             Pt education Injury and recovery.  Graded exercise resumption HEP and DOMS           Pilgrim Rows M,L  15# 2x 10            Heavenly Paloff Press  5# 10\"x 10            Heavenly Chops  5# 2x 10            Pilgrim Reverse Chops  5# 2x 10            Crunches Instructed 2x 10 reviewed            Oblique Crunches Instructed            T-ball bridge   Regular bridges today   2x 10            Side planks on knees             Front planks on knees                          Ther Ex             Upright bike warm up - " cardio/endurance   10 min                                                                                                       Ther Activity             Bosu squats             Ball toss at rebounder on Bosu (flat up)             Monster walks             T-band sidestepping             Gait Training                                       Modalities

## 2025-04-21 ENCOUNTER — OFFICE VISIT (OUTPATIENT)
Dept: PHYSICAL THERAPY | Facility: CLINIC | Age: 45
End: 2025-04-21
Attending: FAMILY MEDICINE
Payer: COMMERCIAL

## 2025-04-21 DIAGNOSIS — S39.81XA SPORTS HERNIA, INITIAL ENCOUNTER: ICD-10-CM

## 2025-04-21 DIAGNOSIS — M25.852 FEMOROACETABULAR IMPINGEMENT OF BOTH HIPS: Primary | ICD-10-CM

## 2025-04-21 DIAGNOSIS — M25.851 FEMOROACETABULAR IMPINGEMENT OF BOTH HIPS: Primary | ICD-10-CM

## 2025-04-21 PROCEDURE — 97110 THERAPEUTIC EXERCISES: CPT

## 2025-04-21 PROCEDURE — 97112 NEUROMUSCULAR REEDUCATION: CPT

## 2025-04-21 NOTE — PROGRESS NOTES
"Daily Note     Today's date: 2025  Patient name: Frank Bustos  : 1980  MRN: [id number]  Referring provider: Len Mena*  Dx:   Encounter Diagnosis     ICD-10-CM    1. Femoroacetabular impingement of both hips  M25.851     M25.852       2. Sports hernia, initial encounter  S39.81XA                      Subjective: Patient reports some soreness no major c/o pain at this time.       Objective: See treatment diary below      Assessment: Pt reports fatigues after tx, no increased pain during or post tx session.       Plan: Continue per plan of care.      Precautions: Right inguinal hernia repair     POC expires Unit limit Auth  expiration date PT/OT + Visit Limit?   6/3/25 NA 25 BOMN                 Visit/Unit Tracking  AUTH Status:  Date 4/8 4/15 4/21            Authorized Used 1 2 3             Remaining                  QR     Manuals 4/8 4/15 4/21          Left L2 UPA mobs JF grade III Not needed.                                                   Neuro Re-Ed             Pt education Injury and recovery.  Graded exercise resumption HEP and DOMS           Heavenly Rows M,L  15# 2x 10  15# M x20 20# L x20          Heavenly Paloff Press  5# 10\"x 10  8# 10\"x10          Fifty Six Chops  5# 2x 10  8# 2x10           Heavenly Reverse Chops  5# 2x 10  6# 2x10          Crunches Instructed 2x 10 reviewed            Oblique Crunches Instructed            T-ball bridge   Regular bridges today   2x 10            Side planks on knees             Front planks on knees             Heavenly mini rotation   5# x15 ea          Ther Ex             Upright bike warm up - cardio/endurance   10 min  10 min                                                                                                     Ther Activity             Bosu squats             Ball toss at rebounder on Bosu (flat up)             Monster walks  GTB 1 lap           T-band sidestepping  GTB 1 lap           Gait Training                   "                     Modalities

## 2025-04-28 ENCOUNTER — OFFICE VISIT (OUTPATIENT)
Dept: PHYSICAL THERAPY | Facility: CLINIC | Age: 45
End: 2025-04-28
Payer: COMMERCIAL

## 2025-04-28 DIAGNOSIS — M25.852 FEMOROACETABULAR IMPINGEMENT OF BOTH HIPS: Primary | ICD-10-CM

## 2025-04-28 DIAGNOSIS — S39.81XA SPORTS HERNIA, INITIAL ENCOUNTER: ICD-10-CM

## 2025-04-28 DIAGNOSIS — M25.851 FEMOROACETABULAR IMPINGEMENT OF BOTH HIPS: Primary | ICD-10-CM

## 2025-04-28 PROCEDURE — 97112 NEUROMUSCULAR REEDUCATION: CPT | Performed by: PHYSICAL THERAPIST

## 2025-04-28 NOTE — PROGRESS NOTES
"Daily Note     Today's date: 2025  Patient name: Frank Bustos  : 1980  MRN: [id number]  Referring provider: Len Mena*  Dx:   Encounter Diagnosis     ICD-10-CM    1. Femoroacetabular impingement of both hips  M25.851     M25.852       2. Sports hernia, initial encounter  S39.81XA                      Subjective: Patient reports the ability to walk 25 minutes when hiking before having some soreness.  Did try climbing a tree and pushing his children on a rope swing, but it caused pain.  Is able to drum lightly without pain, but has pain with vigorous drumming.  Has not had any pain during or after PT exercises.      Objective: See treatment diary below      Assessment: Tolerated treatment well. Patient demonstrated fatigue post treatment and would benefit from continued PT.  Added T-ball for bridges today to increase difficulty of core strengthening.  No complaints of pain.      Plan: Continue per plan of care.      Precautions: Right inguinal hernia repair     POC expires Unit limit Auth  expiration date PT/OT + Visit Limit?   6/3/25 NA 25 BOMN                 Visit/Unit Tracking  AUTH Status:  Date 4/8 4/15 4/21 4/28           Authorized Used 1 2 3 4            Remaining                  QR     Manuals 4/8 4/15 4/21 4/28         Left L2 UPA mobs JF grade III Not needed.                                                   Neuro Re-Ed             Pt education Injury and recovery.  Graded exercise resumption HEP and DOMS           Heavenly Rows M,L  15# 2x 10  15# M x20 20# L x20 15# M x20 20# L x20         Heavenly Paloff Press  5# 10\"x 10  8# 10\"x10 8# 10\"x10         Heavenly Chops  5# 2x 10  8# 2x10  8# 2x10          Heavenly Reverse Chops  5# 2x 10  6# 2x10 6# 2x10         Crunches Instructed 2x 10 reviewed            Oblique Crunches Instructed            T-ball bridge   Regular bridges today   2x 10   Ball 3\" x20         Side planks on knees    2x20\"         Front planks on knees  "            Heavenly mini rotation   5# x15 ea          Ther Ex             Upright bike warm up - cardio/endurance   10 min  10 min x10'                                                                                                    Ther Activity             Bosu squats             Ball toss at rebounder on Bosu (flat up)             Monster walks  GTB 1 lap           T-band sidestepping  GTB 1 lap           Gait Training                                       Modalities

## 2025-04-29 ENCOUNTER — OFFICE VISIT (OUTPATIENT)
Dept: OBGYN CLINIC | Facility: CLINIC | Age: 45
End: 2025-04-29
Payer: COMMERCIAL

## 2025-04-29 VITALS — WEIGHT: 159 LBS | HEIGHT: 68 IN | BODY MASS INDEX: 24.1 KG/M2

## 2025-04-29 DIAGNOSIS — R10.2 PAIN IN PELVIS: Primary | ICD-10-CM

## 2025-04-29 PROCEDURE — 99213 OFFICE O/P EST LOW 20 MIN: CPT | Performed by: FAMILY MEDICINE

## 2025-04-29 NOTE — PROGRESS NOTES
Name: Frank Bustos      : 1980      MRN: [id number]  Encounter Provider: Len Mena DO  Encounter Date: 2025   Encounter department: [facility] [place]  :  Assessment & Plan  Pain in pelvis  44-year-old active male with bilateral groin pain, right worse than left, more than 3 months duration.  X-rays of the pelvis unremarkable for acute osseous abnormality.  Clinical impression is that he has symptoms of pathology of the pelvis.  Symptoms persist despite conservative management of formal therapy and home exercises since 2025, activity modification for more than 2 months more than 6 weeks of alternating with ice and heat.  At this time I will refer him for MRI of the pelvis to evaluate for sports hernia and pelvic stress fracture as invasive management may be warranted.  He will return after having MRI done.  Orders:    MRI pelvis sports hernia wo contrast; Future        History of Present Illness   Chief Complaint   Patient presents with    Pelvis - Follow-up, Pain      HPI  Frank Bustos is a 44 y.o. male who presents for follow-up bilateral groin pain of more than 3 months duration.  He was last seen by me 4 weeks ago at which point he was referred to formal therapy and advised on taking supplements.  He has been attending formal therapy and doing home exercises since 2025.  He reports still experiencing pain.  Reports having pain described localized to the anterior hip/groin aspect on both sides, right worse than left, achy and throbbing, radiating to the upper abdominals on the right, worse with prolonged standing and ambulation, worse with strenuous activity such as pushing and lifting, and improved with resting.  He states that when he gets up in the morning he has little to no pain, but throughout the day and with increased activity symptoms worsen.  Overall his level of physical activity has decreased due to  "symptoms.    History obtained from: patient    Review of Systems   Musculoskeletal:  Positive for arthralgias. Negative for back pain.   Neurological:  Negative for weakness and numbness.          Objective   Ht 5' 8\" (1.727 m)   Wt 72.1 kg (159 lb)   BMI 24.18 kg/m²      Physical Exam  Vitals and nursing note reviewed.   Constitutional:       Appearance: Normal appearance. He is well-developed. He is not ill-appearing or diaphoretic.   HENT:      Head: Normocephalic and atraumatic.      Right Ear: External ear normal.      Left Ear: External ear normal.   Eyes:      General:         Right eye: No discharge.         Left eye: No discharge.   Pulmonary:      Effort: Pulmonary effort is normal. No respiratory distress.   Abdominal:      General: There is no distension.   Musculoskeletal:         General: Tenderness present. No swelling.   Skin:     General: Skin is warm and dry.      Coloration: Skin is not jaundiced or pale.   Neurological:      Mental Status: He is alert and oriented to person, place, and time.   Psychiatric:         Mood and Affect: Mood normal.         Behavior: Behavior normal.         Thought Content: Thought content normal.         Judgment: Judgment normal.       Right Hip Exam     Tenderness   The patient is experiencing tenderness in the anterior.    Range of Motion   External rotation:  30   Internal rotation:  15     Muscle Strength   Abduction: 5/5   Flexion: 5/5     Tests   ALIA: negative    Comments:  Negative FADDIR      Left Hip Exam     Range of Motion   External rotation:  30   Internal rotation: 15     Muscle Strength   Abduction: 5/5   Flexion: 5/5     Tests   ALIA: negative    Comments:  Negative FADDIR             "

## 2025-05-07 ENCOUNTER — APPOINTMENT (OUTPATIENT)
Dept: PHYSICAL THERAPY | Facility: CLINIC | Age: 45
End: 2025-05-07
Attending: FAMILY MEDICINE
Payer: COMMERCIAL

## 2025-05-08 ENCOUNTER — HOSPITAL ENCOUNTER (OUTPATIENT)
Dept: MRI IMAGING | Facility: HOSPITAL | Age: 45
End: 2025-05-08
Attending: FAMILY MEDICINE
Payer: COMMERCIAL

## 2025-05-08 DIAGNOSIS — R10.2 PAIN IN PELVIS: ICD-10-CM

## 2025-05-08 PROCEDURE — 72195 MRI PELVIS W/O DYE: CPT

## 2025-05-12 NOTE — PROGRESS NOTES
"Precautions: Right inguinal hernia repair 12/24    POC expires Unit limit Auth  expiration date PT/OT + Visit Limit?   6/3/25 NA 12/31/25 BOMN                 Visit/Unit Tracking  AUTH Status:  Date 4/8 4/15 4/21 4/28           Authorized Used 1 2 3 4            Remaining                  QR     Manuals 4/8 4/15 4/21 4/28         Left L2 UPA mobs JF grade III Not needed.                                                   Neuro Re-Ed             Pt education Injury and recovery.  Graded exercise resumption HEP and DOMS           Heavenly Rows M,L  15# 2x 10  15# M x20 20# L x20 15# M x20 20# L x20         Heavenly Paloff Press  5# 10\"x 10  8# 10\"x10 8# 10\"x10         Heavenly Chops  5# 2x 10  8# 2x10  8# 2x10          Heavenly Reverse Chops  5# 2x 10  6# 2x10 6# 2x10         Crunches Instructed 2x 10 reviewed            Oblique Crunches Instructed            T-ball bridge   Regular bridges today   2x 10   Ball 3\" x20         Side planks on knees    2x20\"         Front planks on knees             Heavenly mini rotation   5# x15 ea          Ther Ex             Upright bike warm up - cardio/endurance   10 min  10 min x10'                                                                                                    Ther Activity             Bosu squats             Ball toss at rebounder on Bosu (flat up)             Monster walks  GTB 1 lap           T-band sidestepping  GTB 1 lap           Gait Training                                       Modalities                                             "

## 2025-05-13 ENCOUNTER — EVALUATION (OUTPATIENT)
Dept: PHYSICAL THERAPY | Facility: CLINIC | Age: 45
End: 2025-05-13
Attending: FAMILY MEDICINE
Payer: COMMERCIAL

## 2025-05-13 DIAGNOSIS — S39.81XD SPORTS HERNIA, SUBSEQUENT ENCOUNTER: ICD-10-CM

## 2025-05-13 DIAGNOSIS — M25.852 FEMOROACETABULAR IMPINGEMENT OF BOTH HIPS: Primary | ICD-10-CM

## 2025-05-13 DIAGNOSIS — M25.851 FEMOROACETABULAR IMPINGEMENT OF BOTH HIPS: Primary | ICD-10-CM

## 2025-05-13 PROCEDURE — 97110 THERAPEUTIC EXERCISES: CPT | Performed by: PHYSICAL THERAPIST

## 2025-05-13 NOTE — PROGRESS NOTES
PT Discharge    Today's date: 25  Patient name: Frank Bustos  : 1980  MRN: [id number]  Referring provider: Len Mena*  Dx:   Encounter Diagnosis     ICD-10-CM    1. Femoroacetabular impingement of both hips  M25.851     M25.852       2. Sports hernia, subsequent encounter  S39.81XD                       Assessment  Impairments: activity intolerance and pain with function  Functional limitations: Pain increased with playing the drums hard, fast or longer walking, and heavier lifting.  Symptom irritability: low    Assessment details: Patient has attended 3 Physical Therapy treatment sessions over the past 5 weeks.  He has been consistently performing his HEP consisting of core and hip strengthening exercises and states that during this time, his pain levels were relatively unchanged.  He would still note increased pain with prolonged walking, heavier lifting, and with playing his drums hard.  Two weeks ago he started an alternative treatment, [facility], at another facility and has noticed some improvement in his pain.  At this time, he would like to DC PT services to continue the [facility].  He is independent and compliant with his PT program.  He demonstrates normal lumbar and Hip ROM and strength.  There is only minimal discomfort with palpation of the Right Adductor Brevis and Right Rectus Abdominus.  DC PT services at this time per patient request.  Patient to follow up with orthopedics in 2 weeks.  Understanding of Dx/Px/POC: good     Prognosis: good    Goals  STG (4 weeks)  1. Patient will demonstrate the ability to correct posture with minimal VC's- met  2. Patient will demonstrate 25% gains in Hip ROM in all deficient planes- met  3. Patient will report pain as a 4-5/10 at worst with all normal activities- met  4. Patient will report 50% reduction in sleep disturbances related to pain-met  LTG (8 weeks)  1. Patient will report pain as a 0-1/10 at worst with normal  activities- not met  2. Patient will report the ability to sit and work on the computer without increased pain- met  3. Patient will report the ability to resume weight training without increased pain- not met  4. Patient will demonstrate Hip strength WNL to improve improve transfers, quality of gait, and ability to negotiate stairs.- met  5. Patient will be independent and compliant with a HEP in order to maintain gains made with skilled PT services.- met      Plan  Patient would benefit from: skilled physical therapy  Referral necessary: No    Plan of Care beginning date: 2025  Plan of Care expiration date: 6/3/2025  Treatment plan discussed with: patient    Subjective Evaluation    History of Present Illness  Mechanism of injury: Patient states that his pain has been unchanged since starting PT services.  He has been doing his HEP consistently.  No increase in pain with normal, everyday activity.  Playing drums and walking for 15-20 minutes would cause increased pain either later in the day, or the next day.  Patient recently started [facility] over the past two weeks at another facility..  Pain is now decreased in intensity since starting that Electrical Stimulation.  He states location of his pain is unchanged.  Patient would like to DC PT services at this time and continue with an HEP  Patient Goals  Patient goals for therapy: decreased pain  Patient goal: Resume normal activities.  Ride his bike and resume weight training.  Pain  Current pain ratin  At best pain ratin  At worst pain rating: 3  Location: Central pelvis above pubis and alternates to left or right in groin.  Bilateral abdomen (Right > Left)  Quality: dull ache  Exacerbated by: Kicking the soccer ball.  Sitting for a prolonged period and transferring sit to stand.    Social Support  Lives with: young children (11, 8, and 5 y.o.)    Employment status: working (.  Has standing desk and moves around throughout the  day.)    Diagnostic Tests    FCE comments: Denies weakness.  Has not tried doing any strength training recently.  (-) sleep disturbances.  Used the push mower without pain.  Cut down a tree, but did not lift logs.  Was able to use a pitch fork to move mulch without pain.Treatments  Current treatment: medication    Objective     Concurrent Complaints  Negative for night pain, disturbed sleep, bladder dysfunction, bowel dysfunction, saddle (S4) numbness and history of trauma    Postural Observations  Seated posture: good  Standing posture: good      Palpation   Left   No palpable tenderness to the adductor brevis, adductor longus, iliopsoas and rectus abdominus.     Right   No palpable tenderness to the adductor longus and iliopsoas.   Tenderness of the adductor brevis and rectus abdominus.     Additional Palpation Details  No TTP bilateral obliques    Neurological Testing     Sensation     Lumbar   Left   Intact: light touch    Right   Intact: light touch    Active Range of Motion     Lumbar   Normal active range of motion  Left Hip   Normal active range of motion    Right Hip   Normal active range of motion    Tests   Cervical   Negative vertical compression.     Lumbar   Negative prone instability , vertical compression, SIJ compression and sacroiliac distraction.     Left   Negative femoral stretch, passive SLR and slump test.     Right   Negative femoral stretch, passive SLR and slump test.     Left Hip   Negative ALIA, FADIR, scour and sports hernia.     Right Hip   Negative ALIA, FADIR, scour and sports hernia.     Ambulation     Observational Gait   Gait: within functional limits   Walking speed and stride length within functional limits.          Precautions: Right inguinal hernia repair 12/24    POC expires Unit limit Auth  expiration date PT/OT + Visit Limit?   6/3/25 NA 12/31/25 BOMN                 Visit/Unit Tracking  AUTH Status:  Date 4/8 4/15 4/21 4/28           Authorized Used 1 2 3 4             "Remaining                  QR     Manuals 4/8 4/15 4/21 4/28 5/13        Left L2 UPA mobs JF grade III Not needed.                                      RE performed     JF        Neuro Re-Ed             Pt education Injury and recovery.  Graded exercise resumption HEP and DOMS           Vidalia Rows M,L  15# 2x 10  15# M x20 20# L x20 15# M x20 20# L x20         Heavenly Paloff Press  5# 10\"x 10  8# 10\"x10 8# 10\"x10         Vidalia Chops  5# 2x 10  8# 2x10  8# 2x10          Vidalia Reverse Chops  5# 2x 10  6# 2x10 6# 2x10         Crunches Instructed 2x 10 reviewed            Oblique Crunches Instructed            T-ball bridge   Regular bridges today   2x 10   Ball 3\" x20         Side planks on knees    2x20\"         Front planks on knees             Heavenly mini rotation   5# x15 ea          Ther Ex             Upright bike warm up - cardio/endurance   10 min  10 min x10'                                                                                                    Ther Activity             Bosu squats             Ball toss at rebounder on Bosu (flat up)             Monster walks  GTB 1 lap           T-band sidestepping  GTB 1 lap           Gait Training                                       Modalities                                                "

## 2025-05-27 ENCOUNTER — OFFICE VISIT (OUTPATIENT)
Dept: OBGYN CLINIC | Facility: CLINIC | Age: 45
End: 2025-05-27
Payer: COMMERCIAL

## 2025-05-27 VITALS — WEIGHT: 158 LBS | BODY MASS INDEX: 23.95 KG/M2 | HEIGHT: 68 IN

## 2025-05-27 DIAGNOSIS — S39.013D STRAIN OF PELVIS, SUBSEQUENT ENCOUNTER: Primary | ICD-10-CM

## 2025-05-27 PROBLEM — S39.013A STRAIN OF PELVIS: Status: ACTIVE | Noted: 2025-05-27

## 2025-05-27 PROCEDURE — 99213 OFFICE O/P EST LOW 20 MIN: CPT | Performed by: FAMILY MEDICINE

## 2025-05-27 NOTE — PROGRESS NOTES
"Name: Frank Bustos      : 1980      MRN: [id number]  Encounter Provider: Len Mena DO  Encounter Date: 2025   Encounter department: [facility] [place]  :  Assessment & Plan  Strain of pelvis, subsequent encounter  44-year-old male with bilateral groin pain of more than 4 months duration.  MRI of the pelvis is unremarkable for osseous or soft tissue findings of sports hernia.  Clinical impression is that symptoms may be due to abnormal musculoskeletal mechanics/strain.  Symptoms have improved significantly with electrical summation treatment with chiropractor Dr. Dex Christianson.  Recommend he continue current treatment plan and gradually increase level of activity as tolerated.  Follow-up as needed.           History of Present Illness   Chief Complaint   Patient presents with    Pelvis - Follow-up, Pain      HPI  Frank Bustos is a 44 y.o. male who presents for follow-up bilateral groin pain of more than 4 months duration.  He was last seen by me 4 weeks ago at which point he was referred for MRI of the pelvis due to concern for sports hernia and pelvis stress fracture.  Since last visit he has started electrical stimulation treatment with chiropractor Dr. Dex Christianson and he has noted significant change/improvement in symptoms.  He is no longer experiencing pain at the suprapubic area.  He reports experiencing pain described localized to the right proximal medial thigh, achy and sore, nonradiating, rated as 1-2 out of 10 at worst after strenuous activity.  He states that he was able to mow the lawn and also do some raking/shoveling with little to no discomfort.    History obtained from: patient    Review of Systems   Musculoskeletal:  Positive for arthralgias. Negative for back pain.   Neurological:  Negative for weakness and numbness.          Objective   Ht 5' 8\" (1.727 m)   Wt 71.7 kg (158 lb)   BMI 24.02 kg/m²      Physical " Exam  Vitals and nursing note reviewed.   Constitutional:       Appearance: Normal appearance. He is well-developed. He is not ill-appearing or diaphoretic.   HENT:      Head: Normocephalic and atraumatic.      Right Ear: External ear normal.      Left Ear: External ear normal.     Eyes:      General:         Right eye: No discharge.         Left eye: No discharge.     Pulmonary:      Effort: Pulmonary effort is normal. No respiratory distress.   Abdominal:      General: There is no distension.     Skin:     General: Skin is warm and dry.      Coloration: Skin is not jaundiced or pale.     Neurological:      Mental Status: He is alert and oriented to person, place, and time.     Psychiatric:         Mood and Affect: Mood normal.         Behavior: Behavior normal.         Thought Content: Thought content normal.         Judgment: Judgment normal.         Administrative Statements   I have spent a total time of 20 minutes in caring for this patient on the day of the visit/encounter including Diagnostic results, Prognosis, Risks and benefits of tx options, Instructions for management, Patient and family education, Impressions, Documenting in the medical record, and Obtaining or reviewing history  .

## 2025-05-27 NOTE — ASSESSMENT & PLAN NOTE
44-year-old male with bilateral groin pain of more than 4 months duration.  MRI of the pelvis is unremarkable for osseous or soft tissue findings of sports hernia.  Clinical impression is that symptoms may be due to abnormal musculoskeletal mechanics/strain.  Symptoms have improved significantly with electrical summation treatment with chiropractor Dr. Dex Christianson.  Recommend he continue current treatment plan and gradually increase level of activity as tolerated.  Follow-up as needed.

## (undated) DEVICE — TROCAR: Brand: KII FIOS FIRST ENTRY

## (undated) DEVICE — INTENDED FOR TISSUE SEPARATION, AND OTHER PROCEDURES THAT REQUIRE A SHARP SURGICAL BLADE TO PUNCTURE OR CUT.: Brand: BARD-PARKER SAFETY BLADES SIZE 15, STERILE

## (undated) DEVICE — TUBING SUCTION 5MM X 12 FT

## (undated) DEVICE — GLOVE SRG BIOGEL 8

## (undated) DEVICE — ALLENTOWN LAP CHOLE APP PACK: Brand: CARDINAL HEALTH

## (undated) DEVICE — 4-PORT MANIFOLD: Brand: NEPTUNE 2

## (undated) DEVICE — GLOVE SRG BIOGEL 7

## (undated) DEVICE — SYRINGE 20ML LL

## (undated) DEVICE — GLOVE INDICATOR PI UNDERGLOVE SZ 7 BLUE

## (undated) DEVICE — SUTURING DEVICE: Brand: ENDO STITCH

## (undated) DEVICE — SUT VLOC 180  0 8IN  VLOCA008L

## (undated) DEVICE — UROCATCH BAG

## (undated) DEVICE — ENDOSCOPIC VALVE WITH ADAPTER.: Brand: SURSEAL® II

## (undated) DEVICE — DRAPE EQUIPMENT RF WAND

## (undated) DEVICE — PACK TUR

## (undated) DEVICE — NEPTUNE E-SEP SMOKE EVACUATION PENCIL, COATED, 70MM BLADE, PUSH BUTTON SWITCH: Brand: NEPTUNE E-SEP

## (undated) DEVICE — SUT VICRYL 0 UR-6 27 IN J603H

## (undated) DEVICE — 3M™ STERI-STRIP™ REINFORCED ADHESIVE SKIN CLOSURES, R1546, 1/4 IN X 4 IN (6 MM X 100 MM), 10 STRIPS/ENVELOPE: Brand: 3M™ STERI-STRIP™

## (undated) DEVICE — TUBING SMOKE EVAC W/FILTRATION DEVICE PLUMEPORT ACTIV

## (undated) DEVICE — 3M™ TEGADERM™ TRANSPARENT FILM DRESSING FRAME STYLE, 1624W, 2-3/8 IN X 2-3/4 IN (6 CM X 7 CM), 100/CT 4CT/CASE: Brand: 3M™ TEGADERM™

## (undated) DEVICE — PAD GROUNDING DUAL ADULT

## (undated) DEVICE — COTTON TIP APPLICTOR 2 PK

## (undated) DEVICE — TROCAR: Brand: KII® SLEEVE

## (undated) DEVICE — SCD SEQUENTIAL COMPRESSION COMFORT SLEEVE MEDIUM KNEE LENGTH: Brand: KENDALL SCD

## (undated) DEVICE — SUT MONOCRYL 4-0 PS-2 18 IN Y496G

## (undated) DEVICE — TOWEL SURG XR DETECT GREEN STRL RFD

## (undated) DEVICE — CHLORAPREP HI-LITE 26ML ORANGE

## (undated) DEVICE — GAUZE SPONGES,8 PLY: Brand: CURITY